# Patient Record
Sex: FEMALE | Race: WHITE | NOT HISPANIC OR LATINO | Employment: FULL TIME | URBAN - NONMETROPOLITAN AREA
[De-identification: names, ages, dates, MRNs, and addresses within clinical notes are randomized per-mention and may not be internally consistent; named-entity substitution may affect disease eponyms.]

---

## 2017-05-10 ENCOUNTER — OFFICE VISIT (OUTPATIENT)
Dept: RETAIL CLINIC | Facility: CLINIC | Age: 32
End: 2017-05-10

## 2017-05-10 DIAGNOSIS — Z23 FLU VACCINE NEED: Primary | ICD-10-CM

## 2022-12-16 ENCOUNTER — HOSPITAL ENCOUNTER (EMERGENCY)
Facility: HOSPITAL | Age: 37
Discharge: PSYCHIATRIC OR SUBSTANCE USE FACILITY | DRG: 897 | End: 2022-12-16
Attending: EMERGENCY MEDICINE | Admitting: EMERGENCY MEDICINE
Payer: MEDICAID

## 2022-12-16 ENCOUNTER — HOSPITAL ENCOUNTER (INPATIENT)
Facility: HOSPITAL | Age: 37
LOS: 4 days | Discharge: NURSING, REHAB OR LONG-TERM CARE | DRG: 897 | End: 2022-12-20
Attending: PSYCHIATRY & NEUROLOGY | Admitting: PSYCHIATRY & NEUROLOGY
Payer: MEDICAID

## 2022-12-16 VITALS
HEIGHT: 62 IN | BODY MASS INDEX: 22.08 KG/M2 | OXYGEN SATURATION: 99 % | WEIGHT: 120 LBS | DIASTOLIC BLOOD PRESSURE: 76 MMHG | RESPIRATION RATE: 16 BRPM | SYSTOLIC BLOOD PRESSURE: 142 MMHG | TEMPERATURE: 98.8 F | HEART RATE: 97 BPM

## 2022-12-16 DIAGNOSIS — F10.10 ALCOHOL ABUSE: Primary | ICD-10-CM

## 2022-12-16 PROBLEM — F19.20 CHEMICAL DEPENDENCY: Status: ACTIVE | Noted: 2022-12-16

## 2022-12-16 LAB
ALBUMIN SERPL-MCNC: 4.85 G/DL (ref 3.5–5.2)
ALBUMIN/GLOB SERPL: 1.5 G/DL
ALP SERPL-CCNC: 87 U/L (ref 39–117)
ALT SERPL W P-5'-P-CCNC: 33 U/L (ref 1–33)
AMPHET+METHAMPHET UR QL: NEGATIVE
AMPHETAMINES UR QL: NEGATIVE
ANION GAP SERPL CALCULATED.3IONS-SCNC: 18.1 MMOL/L (ref 5–15)
AST SERPL-CCNC: 29 U/L (ref 1–32)
BACTERIA UR QL AUTO: ABNORMAL /HPF
BARBITURATES UR QL SCN: NEGATIVE
BASOPHILS # BLD AUTO: 0.1 10*3/MM3 (ref 0–0.2)
BASOPHILS NFR BLD AUTO: 0.8 % (ref 0–1.5)
BENZODIAZ UR QL SCN: NEGATIVE
BILIRUB SERPL-MCNC: 0.5 MG/DL (ref 0–1.2)
BILIRUB UR QL STRIP: NEGATIVE
BUN SERPL-MCNC: 8 MG/DL (ref 6–20)
BUN/CREAT SERPL: 11.4 (ref 7–25)
BUPRENORPHINE SERPL-MCNC: NEGATIVE NG/ML
CALCIUM SPEC-SCNC: 10.1 MG/DL (ref 8.6–10.5)
CANNABINOIDS SERPL QL: NEGATIVE
CHLORIDE SERPL-SCNC: 94 MMOL/L (ref 98–107)
CLARITY UR: ABNORMAL
CO2 SERPL-SCNC: 23.9 MMOL/L (ref 22–29)
COCAINE UR QL: NEGATIVE
COLOR UR: YELLOW
CREAT SERPL-MCNC: 0.7 MG/DL (ref 0.57–1)
DEPRECATED RDW RBC AUTO: 48.3 FL (ref 37–54)
EGFRCR SERPLBLD CKD-EPI 2021: 114.4 ML/MIN/1.73
EOSINOPHIL # BLD AUTO: 0 10*3/MM3 (ref 0–0.4)
EOSINOPHIL NFR BLD AUTO: 0 % (ref 0.3–6.2)
ERYTHROCYTE [DISTWIDTH] IN BLOOD BY AUTOMATED COUNT: 17.8 % (ref 12.3–15.4)
ETHANOL BLD-MCNC: <10 MG/DL (ref 0–10)
ETHANOL UR QL: <0.01 %
FLUAV RNA RESP QL NAA+PROBE: NOT DETECTED
FLUBV RNA RESP QL NAA+PROBE: NOT DETECTED
GLOBULIN UR ELPH-MCNC: 3.2 GM/DL
GLUCOSE SERPL-MCNC: 128 MG/DL (ref 65–99)
GLUCOSE UR STRIP-MCNC: ABNORMAL MG/DL
HCG SERPL QL: NEGATIVE
HCT VFR BLD AUTO: 35.8 % (ref 34–46.6)
HGB BLD-MCNC: 11.1 G/DL (ref 12–15.9)
HGB UR QL STRIP.AUTO: NEGATIVE
HYALINE CASTS UR QL AUTO: ABNORMAL /LPF
IMM GRANULOCYTES # BLD AUTO: 0.09 10*3/MM3 (ref 0–0.05)
IMM GRANULOCYTES NFR BLD AUTO: 0.7 % (ref 0–0.5)
KETONES UR QL STRIP: ABNORMAL
LEUKOCYTE ESTERASE UR QL STRIP.AUTO: NEGATIVE
LYMPHOCYTES # BLD AUTO: 1.73 10*3/MM3 (ref 0.7–3.1)
LYMPHOCYTES NFR BLD AUTO: 13.9 % (ref 19.6–45.3)
MAGNESIUM SERPL-MCNC: 1.9 MG/DL (ref 1.6–2.6)
MCH RBC QN AUTO: 23.4 PG (ref 26.6–33)
MCHC RBC AUTO-ENTMCNC: 31 G/DL (ref 31.5–35.7)
MCV RBC AUTO: 75.5 FL (ref 79–97)
METHADONE UR QL SCN: NEGATIVE
MONOCYTES # BLD AUTO: 0.73 10*3/MM3 (ref 0.1–0.9)
MONOCYTES NFR BLD AUTO: 5.9 % (ref 5–12)
NEUTROPHILS NFR BLD AUTO: 78.7 % (ref 42.7–76)
NEUTROPHILS NFR BLD AUTO: 9.77 10*3/MM3 (ref 1.7–7)
NITRITE UR QL STRIP: POSITIVE
NRBC BLD AUTO-RTO: 0 /100 WBC (ref 0–0.2)
OPIATES UR QL: NEGATIVE
OXYCODONE UR QL SCN: NEGATIVE
PCP UR QL SCN: NEGATIVE
PH UR STRIP.AUTO: 7.5 [PH] (ref 5–8)
PLATELET # BLD AUTO: 439 10*3/MM3 (ref 140–450)
PMV BLD AUTO: 9 FL (ref 6–12)
POTASSIUM SERPL-SCNC: 3.9 MMOL/L (ref 3.5–5.2)
PROPOXYPH UR QL: NEGATIVE
PROT SERPL-MCNC: 8 G/DL (ref 6–8.5)
PROT UR QL STRIP: ABNORMAL
RBC # BLD AUTO: 4.74 10*6/MM3 (ref 3.77–5.28)
RBC # UR STRIP: ABNORMAL /HPF
REF LAB TEST METHOD: ABNORMAL
SARS-COV-2 RNA RESP QL NAA+PROBE: NOT DETECTED
SODIUM SERPL-SCNC: 136 MMOL/L (ref 136–145)
SP GR UR STRIP: 1.01 (ref 1–1.03)
SQUAMOUS #/AREA URNS HPF: ABNORMAL /HPF
TRICYCLICS UR QL SCN: NEGATIVE
UROBILINOGEN UR QL STRIP: ABNORMAL
WBC # UR STRIP: ABNORMAL /HPF
WBC NRBC COR # BLD: 12.42 10*3/MM3 (ref 3.4–10.8)

## 2022-12-16 PROCEDURE — 87636 SARSCOV2 & INF A&B AMP PRB: CPT | Performed by: EMERGENCY MEDICINE

## 2022-12-16 PROCEDURE — 83735 ASSAY OF MAGNESIUM: CPT | Performed by: EMERGENCY MEDICINE

## 2022-12-16 PROCEDURE — 93005 ELECTROCARDIOGRAM TRACING: CPT | Performed by: PSYCHIATRY & NEUROLOGY

## 2022-12-16 PROCEDURE — 99285 EMERGENCY DEPT VISIT HI MDM: CPT

## 2022-12-16 PROCEDURE — 93010 ELECTROCARDIOGRAM REPORT: CPT | Performed by: SPECIALIST

## 2022-12-16 PROCEDURE — 84703 CHORIONIC GONADOTROPIN ASSAY: CPT | Performed by: EMERGENCY MEDICINE

## 2022-12-16 PROCEDURE — 36415 COLL VENOUS BLD VENIPUNCTURE: CPT

## 2022-12-16 PROCEDURE — HZ2ZZZZ DETOXIFICATION SERVICES FOR SUBSTANCE ABUSE TREATMENT: ICD-10-PCS | Performed by: PSYCHIATRY & NEUROLOGY

## 2022-12-16 PROCEDURE — 80053 COMPREHEN METABOLIC PANEL: CPT | Performed by: EMERGENCY MEDICINE

## 2022-12-16 PROCEDURE — 85025 COMPLETE CBC W/AUTO DIFF WBC: CPT | Performed by: EMERGENCY MEDICINE

## 2022-12-16 PROCEDURE — 81001 URINALYSIS AUTO W/SCOPE: CPT | Performed by: EMERGENCY MEDICINE

## 2022-12-16 PROCEDURE — 87086 URINE CULTURE/COLONY COUNT: CPT | Performed by: PHYSICIAN ASSISTANT

## 2022-12-16 PROCEDURE — C9803 HOPD COVID-19 SPEC COLLECT: HCPCS

## 2022-12-16 PROCEDURE — 80306 DRUG TEST PRSMV INSTRMNT: CPT | Performed by: EMERGENCY MEDICINE

## 2022-12-16 PROCEDURE — 82077 ASSAY SPEC XCP UR&BREATH IA: CPT | Performed by: EMERGENCY MEDICINE

## 2022-12-16 RX ORDER — LORAZEPAM 2 MG/1
2 TABLET ORAL
Status: COMPLETED | OUTPATIENT
Start: 2022-12-17 | End: 2022-12-17

## 2022-12-16 RX ORDER — ECHINACEA PURPUREA EXTRACT 125 MG
2 TABLET ORAL AS NEEDED
Status: DISCONTINUED | OUTPATIENT
Start: 2022-12-16 | End: 2022-12-20 | Stop reason: HOSPADM

## 2022-12-16 RX ORDER — IBUPROFEN 400 MG/1
400 TABLET ORAL EVERY 6 HOURS PRN
Status: DISCONTINUED | OUTPATIENT
Start: 2022-12-16 | End: 2022-12-20 | Stop reason: HOSPADM

## 2022-12-16 RX ORDER — TRAZODONE HYDROCHLORIDE 50 MG/1
50 TABLET ORAL NIGHTLY PRN
Status: DISCONTINUED | OUTPATIENT
Start: 2022-12-16 | End: 2022-12-20 | Stop reason: HOSPADM

## 2022-12-16 RX ORDER — BENZTROPINE MESYLATE 1 MG/ML
1 INJECTION INTRAMUSCULAR; INTRAVENOUS ONCE AS NEEDED
Status: DISCONTINUED | OUTPATIENT
Start: 2022-12-16 | End: 2022-12-20 | Stop reason: HOSPADM

## 2022-12-16 RX ORDER — BENZTROPINE MESYLATE 1 MG/1
2 TABLET ORAL ONCE AS NEEDED
Status: DISCONTINUED | OUTPATIENT
Start: 2022-12-16 | End: 2022-12-20 | Stop reason: HOSPADM

## 2022-12-16 RX ORDER — LORAZEPAM 1 MG/1
1 TABLET ORAL EVERY 4 HOURS PRN
Status: ACTIVE | OUTPATIENT
Start: 2022-12-19 | End: 2022-12-20

## 2022-12-16 RX ORDER — LOPERAMIDE HYDROCHLORIDE 2 MG/1
2 CAPSULE ORAL
Status: DISCONTINUED | OUTPATIENT
Start: 2022-12-16 | End: 2022-12-20 | Stop reason: HOSPADM

## 2022-12-16 RX ORDER — LORAZEPAM 2 MG/1
2 TABLET ORAL EVERY 4 HOURS PRN
Status: ACTIVE | OUTPATIENT
Start: 2022-12-17 | End: 2022-12-18

## 2022-12-16 RX ORDER — ALUMINA, MAGNESIA, AND SIMETHICONE 2400; 2400; 240 MG/30ML; MG/30ML; MG/30ML
15 SUSPENSION ORAL EVERY 6 HOURS PRN
Status: DISCONTINUED | OUTPATIENT
Start: 2022-12-16 | End: 2022-12-20 | Stop reason: HOSPADM

## 2022-12-16 RX ORDER — CHOLECALCIFEROL (VITAMIN D3) 125 MCG
5 CAPSULE ORAL NIGHTLY PRN
Status: ON HOLD | COMMUNITY
End: 2022-12-20 | Stop reason: SDUPTHER

## 2022-12-16 RX ORDER — LORAZEPAM 0.5 MG/1
0.5 TABLET ORAL
Status: DISCONTINUED | OUTPATIENT
Start: 2022-12-20 | End: 2022-12-19

## 2022-12-16 RX ORDER — HYDROXYZINE HYDROCHLORIDE 25 MG/1
50 TABLET, FILM COATED ORAL EVERY 6 HOURS PRN
Status: DISCONTINUED | OUTPATIENT
Start: 2022-12-16 | End: 2022-12-20 | Stop reason: HOSPADM

## 2022-12-16 RX ORDER — BUSPIRONE HYDROCHLORIDE 10 MG/1
5 TABLET ORAL 3 TIMES DAILY PRN
Status: DISCONTINUED | OUTPATIENT
Start: 2022-12-16 | End: 2022-12-20 | Stop reason: HOSPADM

## 2022-12-16 RX ORDER — ONDANSETRON 4 MG/1
4 TABLET, FILM COATED ORAL EVERY 6 HOURS PRN
Status: DISCONTINUED | OUTPATIENT
Start: 2022-12-16 | End: 2022-12-20 | Stop reason: HOSPADM

## 2022-12-16 RX ORDER — CHOLECALCIFEROL (VITAMIN D3) 125 MCG
5 CAPSULE ORAL NIGHTLY PRN
Status: DISCONTINUED | OUTPATIENT
Start: 2022-12-16 | End: 2022-12-20 | Stop reason: HOSPADM

## 2022-12-16 RX ORDER — BUSPIRONE HYDROCHLORIDE 5 MG/1
5 TABLET ORAL 3 TIMES DAILY PRN
Status: ON HOLD | COMMUNITY
End: 2022-12-20 | Stop reason: SDUPTHER

## 2022-12-16 RX ORDER — LORAZEPAM 0.5 MG/1
0.5 TABLET ORAL EVERY 4 HOURS PRN
Status: DISCONTINUED | OUTPATIENT
Start: 2022-12-20 | End: 2022-12-20 | Stop reason: HOSPADM

## 2022-12-16 RX ORDER — LORAZEPAM 2 MG/1
2 TABLET ORAL
Status: DISPENSED | OUTPATIENT
Start: 2022-12-16 | End: 2022-12-17

## 2022-12-16 RX ORDER — LORAZEPAM 1 MG/1
1 TABLET ORAL
Status: DISCONTINUED | OUTPATIENT
Start: 2022-12-19 | End: 2022-12-19

## 2022-12-16 RX ORDER — CEFDINIR 300 MG/1
300 CAPSULE ORAL EVERY 12 HOURS SCHEDULED
Status: DISCONTINUED | OUTPATIENT
Start: 2022-12-16 | End: 2022-12-20 | Stop reason: HOSPADM

## 2022-12-16 RX ORDER — FAMOTIDINE 20 MG/1
20 TABLET, FILM COATED ORAL 2 TIMES DAILY PRN
Status: DISCONTINUED | OUTPATIENT
Start: 2022-12-16 | End: 2022-12-20 | Stop reason: HOSPADM

## 2022-12-16 RX ORDER — LORAZEPAM 2 MG/1
2 TABLET ORAL ONCE
Status: COMPLETED | OUTPATIENT
Start: 2022-12-16 | End: 2022-12-16

## 2022-12-16 RX ORDER — BENZONATATE 100 MG/1
100 CAPSULE ORAL 3 TIMES DAILY PRN
Status: DISCONTINUED | OUTPATIENT
Start: 2022-12-16 | End: 2022-12-20 | Stop reason: HOSPADM

## 2022-12-16 RX ORDER — ACETAMINOPHEN 325 MG/1
650 TABLET ORAL EVERY 6 HOURS PRN
Status: DISCONTINUED | OUTPATIENT
Start: 2022-12-16 | End: 2022-12-20 | Stop reason: HOSPADM

## 2022-12-16 RX ADMIN — LORAZEPAM 2 MG: 2 TABLET ORAL at 14:35

## 2022-12-16 RX ADMIN — CEFDINIR 300 MG: 300 CAPSULE ORAL at 14:35

## 2022-12-16 RX ADMIN — CEFDINIR 300 MG: 300 CAPSULE ORAL at 21:31

## 2022-12-16 RX ADMIN — LORAZEPAM 2 MG: 2 TABLET ORAL at 12:45

## 2022-12-16 RX ADMIN — LORAZEPAM 2 MG: 2 TABLET ORAL at 19:06

## 2022-12-16 NOTE — NURSING NOTE
Patient arrived at intake. Searched with 2 staff members present. Patient's belongings placed in safe storage. Patient advised to wear a mask, and to remain 6 feet from others.

## 2022-12-16 NOTE — NURSING NOTE
Seeking help to detox off of alcohol. Drinking 1/5th of rum or whiskey daily for 1 year except when in treatment at the [facility] in October for 28 days, then sober for 2-3 week after discharge. She is going through a divorce, she and   in November. Patient and her 4 children-ages 11, 9, 7, 5 years are living with mother-in-law. Patient's mother is ill, she has end stage breast cancer. Patient had a fall about 7 weeks ago and fractured her left tibia- she is wearing a brace during ambulation to immobilize. She got a DUI on Monday, 12/12/22, and has court January 29*,2022. She reports hx of auditory hallucinations this summer after not drinking or sleeping for three days. Anxiety and depression rated 5/10, craving rated 0/10. CIWA score: 8 She reports fair appetite, little to eat today but making self eat. She reports drinking until passing out, unable to stay asleep, not feeling rested, sleeping 4 hours- off/on last night.

## 2022-12-16 NOTE — NURSING NOTE
PT is a 37 y o female who presents to Intake for Detox off of alcohol. PT states her last drink was Bacardi yesterday. Could not state what time. States she usually drinks a fifth per day, but does not believe she did this yesterday. She has been drinking this amount off and on for a year.  Longest period of sobriety was 6 weeks when she was at the [facility] October 2022.     COWS 9, CIWA 9    Rates depression 9/10, and anxiety 10/10.    Denies SI, denies HI.    PT states she has mitrovalve prolapse, but is asymptomatic.     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     PT states she is going to the [facility] after she id discharged here.

## 2022-12-16 NOTE — NURSING NOTE
PT is preparing to be admitting for OBS on detox. Called floor. Stacie will call back to receive report.

## 2022-12-16 NOTE — ED PROVIDER NOTES
Subjective   History of Present Illness  37-year-old female presents secondary to need for alcohol detox.  Patient states that she has been drinking alcohol for years however for the last has become all day long.  She states that she typically drinks about 1/5 a day.  She denies any suicidal homicidal ideation.  She last drank last evening however she is not sure what time she stopped.  She denies any new medical complaints.  No exposure to flu or COVID.  She voices no other complaints        Review of Systems   Constitutional: Negative.  Negative for fever.   HENT: Negative.    Respiratory: Negative.    Cardiovascular: Negative.  Negative for chest pain.   Gastrointestinal: Negative.  Negative for abdominal pain.   Endocrine: Negative.    Genitourinary: Negative.  Negative for dysuria.   Skin: Negative.    Neurological: Negative.    Psychiatric/Behavioral: Negative.    All other systems reviewed and are negative.      Past Medical History:   Diagnosis Date   • MVP (mitral valve prolapse)        Allergies   Allergen Reactions   • Morphine Hives and GI Intolerance       Past Surgical History:   Procedure Laterality Date   •  SECTION      I have had 5 c sections in the past       No family history on file.    Social History     Socioeconomic History   • Marital status:    Tobacco Use   • Smoking status: Never   • Smokeless tobacco: Never   Vaping Use   • Vaping Use: Never used   Substance and Sexual Activity   • Alcohol use: Not Currently     Comment: I drink a fifth of alcohol per day.   • Drug use: Yes     Types: Marijuana     Comment: I smoke occasional. I smoked a joint a month ago.   • Sexual activity: Yes     Partners: Male           Objective   Physical Exam  Vitals and nursing note reviewed.   Constitutional:       General: She is not in acute distress.     Appearance: She is well-developed. She is not diaphoretic.   HENT:      Head: Normocephalic and atraumatic.      Right Ear: External ear  normal.      Left Ear: External ear normal.      Nose: Nose normal.   Eyes:      Conjunctiva/sclera: Conjunctivae normal.      Pupils: Pupils are equal, round, and reactive to light.   Neck:      Vascular: No JVD.      Trachea: No tracheal deviation.   Cardiovascular:      Rate and Rhythm: Normal rate and regular rhythm.      Heart sounds: Normal heart sounds. No murmur heard.  Pulmonary:      Effort: Pulmonary effort is normal. No respiratory distress.      Breath sounds: Normal breath sounds. No wheezing.   Abdominal:      General: Bowel sounds are normal.      Palpations: Abdomen is soft.      Tenderness: There is no abdominal tenderness.   Musculoskeletal:         General: No deformity. Normal range of motion.      Cervical back: Normal range of motion and neck supple.   Skin:     General: Skin is warm and dry.      Coloration: Skin is not pale.      Findings: No erythema or rash.   Neurological:      Mental Status: She is alert and oriented to person, place, and time.      Cranial Nerves: No cranial nerve deficit.   Psychiatric:         Behavior: Behavior normal.         Thought Content: Thought content normal. Thought content does not include homicidal or suicidal ideation.         Procedures           ED Course                                           MDM  Number of Diagnoses or Management Options     Amount and/or Complexity of Data Reviewed  Clinical lab tests: ordered and reviewed        Final diagnoses:   Alcohol abuse       ED Disposition  ED Disposition     ED Disposition   DC/Transfer to Behavioral Health    Condition   Stable    Comment   --             No follow-up provider specified.       Medication List      No changes were made to your prescriptions during this visit.          Delvin Mart PA  12/16/22 1179

## 2022-12-16 NOTE — PLAN OF CARE
Problem: Adult Behavioral Health Plan of Care  Goal: Plan of Care Review  12/16/2022 1740 by Stacie Mooney, RN  Outcome: Ongoing, Progressing  Flowsheets (Taken 12/16/2022 1740)  Plan of Care Reviewed With: patient  Patient Agreement with Plan of Care: agrees  12/16/2022 1739 by Stacie Mooney, RN  Outcome: Ongoing, Progressing  Flowsheets (Taken 12/16/2022 1739)  Plan of Care Reviewed With: patient  Patient Agreement with Plan of Care: agrees   Goal Outcome Evaluation:  Plan of Care Reviewed With: patient  Patient Agreement with Plan of Care: agrees

## 2022-12-16 NOTE — NURSING NOTE
Dr. elizalde notified of clients pulse of 123, ciwa score is 11. Order given for admission to psychiatry and imitate ativan detox admission day.

## 2022-12-16 NOTE — PLAN OF CARE
Problem: Adult Behavioral Health Plan of Care  Goal: Plan of Care Review  Outcome: Ongoing, Progressing  Flowsheets (Taken 12/16/2022 2410)  Plan of Care Reviewed With: patient  Patient Agreement with Plan of Care: agrees   Goal Outcome Evaluation:  Plan of Care Reviewed With: patient  Patient Agreement with Plan of Care: agrees

## 2022-12-16 NOTE — NURSING NOTE
Spoke with Dr. Marie. Reviewed labs, and assessment. New orders noted to admit to obs on detox routine orders. [id number].

## 2022-12-17 LAB
BACTERIA SPEC AEROBE CULT: NORMAL
QT INTERVAL: 336 MS
QTC INTERVAL: 464 MS

## 2022-12-17 PROCEDURE — 99223 1ST HOSP IP/OBS HIGH 75: CPT | Performed by: PSYCHIATRY & NEUROLOGY

## 2022-12-17 RX ADMIN — Medication 100 MG: at 16:22

## 2022-12-17 RX ADMIN — BUSPIRONE HYDROCHLORIDE 5 MG: 10 TABLET ORAL at 08:31

## 2022-12-17 RX ADMIN — Medication 5 MG: at 21:26

## 2022-12-17 RX ADMIN — LORAZEPAM 2 MG: 2 TABLET ORAL at 21:26

## 2022-12-17 RX ADMIN — CEFDINIR 300 MG: 300 CAPSULE ORAL at 08:31

## 2022-12-17 RX ADMIN — LORAZEPAM 2 MG: 2 TABLET ORAL at 14:42

## 2022-12-17 RX ADMIN — IBUPROFEN 400 MG: 400 TABLET, FILM COATED ORAL at 08:31

## 2022-12-17 RX ADMIN — CEFDINIR 300 MG: 300 CAPSULE ORAL at 21:26

## 2022-12-17 RX ADMIN — LORAZEPAM 2 MG: 2 TABLET ORAL at 08:31

## 2022-12-17 NOTE — H&P
INITIAL PSYCHIATRIC HISTORY & PHYSICAL    Patient Identification:  Name:   Nanette Pabon  Age:   37 y.o.  Sex:   female  :   1985  MRN:   [id number]  Visit Number:   [id number]  Primary Care Physician:   Daniel Bauer MD    SUBJECTIVE    CC/Focus of Exam: detox    HPI: Nanette Pabon is a 37 y.o. female who was admitted on 2022 with complaints of alcohol use and withdrawals. The patient reports a long history of substance use. First use was 33 years old. Over time the use increased and the patient  continued to use despite negative consequences. The patient endorses symptoms of tolerance and withdrawals. Has tried to cut down and stop but has not been successful. Spends too much time and resources in pursuit of substance use. Longest period of sobriety is reported to be 6 weeks.  Currently using fifth of bacardi rum  Last use 12-  Withdrawal symptoms Patient denies any  Patient denies any substance abuse. Patient denies any tobacco use.  Patient denies any history of seizures with withdrawal. Patient states family as the reason she relapsed. Patient states going through a divorce and her mother's health as stressors in her life. Patient denies any history of physical, mental or sexual abuse. Patient rates her appetite as fair. Patient rates her sleep as poor. Patient denies any nightmares. Patient rates her anxiety on a scale of 1/10 with 10 being the most severe a 5. Patient rates her depression on a scale of 1/10 with 10 being the most severe a 5. Patient denies any cravings. Patient's CIWA was 11. Patient's COWS was 9. Patient denies any suicidal ideation. Patient denies any homicidal ideation. Patient denies any hallucinations.  Patient was admitted to [facility] psychiatry for further safety and stabilization.    Available medical/psychiatric records reviewed and incorporated into the current document.     PAST PSYCHIATRIC HX: Patient has had no prior admissions. Patient  denies any outpatient care.     SUBSTANCE USE HX: UDS was negative. See HPI for current use.     SOCIAL HX: Patient states that she was born and raised in [place], [place]. Patient states that she currently resides with her in laws in [place], [place]. Patient states that she is  but states that they are currently . Patient states that she has 4 children that lives with their father. Patient states that she is currently unemployed. Patient states that she has a associate's degree. Patient states that she has legal issues. Patient states that she goes to court 2023 for a DUI.     Past Medical History:   Diagnosis Date   • Fracture left tibia     about 7 weeks ago// end of 2022   • MVP (mitral valve prolapse)        Past Surgical History:   Procedure Laterality Date   •  SECTION      I have had 5 c sections in the past       Family History   Problem Relation Age of Onset   • Breast cancer Mother    • Alcohol abuse Father          Medications Prior to Admission   Medication Sig Dispense Refill Last Dose   • busPIRone (BUSPAR) 5 MG tablet Take 5 mg by mouth 3 (Three) Times a Day As Needed.   12/15/2022   • melatonin 5 MG tablet tablet Take 5 mg by mouth At Night As Needed.   Past Week           ALLERGIES:  Morphine    Temp:  [97.1 °F (36.2 °C)-99.3 °F (37.4 °C)] 98.1 °F (36.7 °C)  Heart Rate:  [] 110  Resp:  [16-18] 16  BP: (106-155)/(63-92) 106/63    REVIEW OF SYSTEMS:  Review of Systems   Constitutional: Positive for activity change and fatigue.   HENT: Negative for congestion and voice change.    Eyes: Negative for photophobia and visual disturbance.   Respiratory: Negative for shortness of breath and wheezing.    Cardiovascular: Negative for chest pain and palpitations.   Gastrointestinal: Negative for nausea and vomiting.   Endocrine: Negative for cold intolerance and heat intolerance.   Genitourinary: Negative for frequency and urgency.   Musculoskeletal: Negative for neck  pain and neck stiffness.   Skin: Negative for rash and wound.   Allergic/Immunologic: Negative for environmental allergies and food allergies.   Neurological: Positive for tremors and weakness.   Hematological: Negative for adenopathy. Does not bruise/bleed easily.   Psychiatric/Behavioral: Positive for dysphoric mood. The patient is nervous/anxious.       See HPI for psychiatric ROS  OBJECTIVE    PHYSICAL EXAM:  Physical Exam  Constitutional:       Appearance: Normal appearance. She is normal weight.   HENT:      Head: Normocephalic and atraumatic.      Right Ear: External ear normal.      Left Ear: External ear normal.      Nose: Nose normal.      Mouth/Throat:      Mouth: Mucous membranes are moist.      Pharynx: Oropharynx is clear.   Eyes:      Extraocular Movements: Extraocular movements intact.      Conjunctiva/sclera: Conjunctivae normal.      Pupils: Pupils are equal, round, and reactive to light.   Cardiovascular:      Rate and Rhythm: Normal rate and regular rhythm.      Pulses: Normal pulses.      Heart sounds: Normal heart sounds.   Pulmonary:      Effort: Pulmonary effort is normal.      Breath sounds: Normal breath sounds.   Abdominal:      General: Abdomen is flat. Bowel sounds are normal.      Palpations: Abdomen is soft.   Musculoskeletal:         General: Normal range of motion.      Cervical back: Normal range of motion and neck supple.   Skin:     General: Skin is warm and dry.   Neurological:      General: No focal deficit present.      Mental Status: She is alert and oriented to person, place, and time. Mental status is at baseline.         MENTAL STATUS EXAM:               Hygiene:   fair  Cooperation:  Cooperative  Eye Contact:  Good  Psychomotor Behavior:  Appropriate  Affect:  Appropriate  Hopelessness: 5  Speech:  Normal  Linear  Thought Content:  Normal  Suicidal:  None  Homicidal:  None  Hallucinations:  None  Delusion:  None  Memory:  Intact  Orientation:  Person, Place, Time and  Situation  Reliability:  fair  Insight:  Fair  Judgement:  Poor  Impulse Control:  Poor      Imaging Results (Last 24 Hours)     ** No results found for the last 24 hours. **           ECG/EMG Results (most recent)     Procedure Component Value Units Date/Time    ECG 12 Lead Other [[id number]] Collected: 12/16/22 1554     Updated: 12/16/22 1623     QT Interval 336 ms      QTC Interval 464 ms     Narrative:      Test Reason : Other~  Blood Pressure :   */*   mmHG  Vent. Rate : 115 BPM     Atrial Rate : 115 BPM     P-R Int : 124 ms          QRS Dur :  80 ms      QT Int : 336 ms       P-R-T Axes :  49  53  45 degrees     QTc Int : 464 ms    Sinus tachycardia  Otherwise normal ECG  When compared with ECG of 16-DEC-2022 15:54, (Unconfirmed)  No significant change was found    Referred By: ANDREWS           Confirmed By:            Lab Results   Component Value Date    GLUCOSE 128 (H) 12/16/2022    BUN 8 12/16/2022    CREATININE 0.70 12/16/2022    BCR 11.4 12/16/2022    CO2 23.9 12/16/2022    CALCIUM 10.1 12/16/2022    ALBUMIN 4.85 12/16/2022    AST 29 12/16/2022    ALT 33 12/16/2022       Lab Results   Component Value Date    WBC 12.42 (H) 12/16/2022    HGB 11.1 (L) 12/16/2022    HCT 35.8 12/16/2022    MCV 75.5 (L) 12/16/2022     12/16/2022       Pain Management Panel     Pain Management Panel Latest Ref Rng & Units 12/16/2022    AMPHETAMINES SCREEN, URINE Negative Negative    BARBITURATES SCREEN Negative Negative    BENZODIAZEPINE SCREEN, URINE Negative Negative    BUPRENORPHINEUR Negative Negative    COCAINE SCREEN, URINE Negative Negative    METHADONE SCREEN, URINE Negative Negative    METHAMPHETAMINEUR Negative Negative          Brief Urine Lab Results  (Last result in the past 365 days)      Color   Clarity   Blood   Leuk Est   Nitrite   Protein   CREAT   Urine HCG        12/16/22 1143 Yellow   Cloudy   Negative   Negative   Positive   Trace                 Reviewed labs and studies done with this admission.        ASSESSMENT & PLAN:      Alcohol use disorder, severe, dependence, in withdrawal  -Admitted for crisis stabilization and voluntary detox  -Ativan detox protocol with CIWA  -Supplement thiamine and multivitamin  -Encourage cessation and substance abuse treatment at discharge    UTI in  -Asymptomatic but UA indicative of UTI  -Continue cefdinir 300 mg twice daily for 5 days    The patient has been admitted for safety and stabilization.  Patient will be monitored for suicidality daily and maintained on Special Precautions Level 4 (q30 min checks)Special  Precautions Level 4 (q30 min checks).  The patient will have individual and group therapy with a master's level therapist. A master treatment plan will be developed and agreed upon by the patient and his/her treatment team.  The patient's estimated length of stay in the hospital is 5-7 days.           Written by Nan Avila acting as scribe for Dr.Jonathan Rhodes signature on this note affirms that the note adequately documents the care provided.   This note was generated using a scribe,   Nan Avila MA  12/17/22  10:55 AM EST

## 2022-12-17 NOTE — NURSING NOTE
Client attempted several times with different staff to call a . States \"I need to let them know right away that I want to do an outpatient program. They are there til six ocklock today.\" client was allowed to use phone at desk and no answer at the number she called. Immediately after her  called and asked for her stating that she just called him. Client lied to staff about who she was calling.

## 2022-12-17 NOTE — PLAN OF CARE
Goal Outcome Evaluation:  Plan of Care Reviewed With: patient  Patient Agreement with Plan of Care: agrees     Progress: improving     Pt slept well, appetite is poor for shift, and refused group. Pt given Ativan 2mg PO prn for tachycardia.

## 2022-12-17 NOTE — DISCHARGE INSTR - APPOINTMENTS
[facility]  [email or web address]  [address] [address] [phone]     Return at discharge. 12/20/2022

## 2022-12-17 NOTE — PLAN OF CARE
Problem: Adult Behavioral Health Plan of Care  Goal: Plan of Care Review  12/17/2022 1642 by Stacie Mooney, RN  Outcome: Ongoing, Progressing  Flowsheets (Taken 12/17/2022 1642)  Plan of Care Reviewed With: patient  Patient Agreement with Plan of Care: agrees  12/17/2022 1641 by Stacie Mooney, RN  Outcome: Ongoing, Progressing  Flowsheets  Taken 12/17/2022 1641  Plan of Care Reviewed With: patient  Patient Agreement with Plan of Care: agrees  Taken 12/17/2022 0806  Plan of Care Reviewed With: patient  Patient Agreement with Plan of Care: agrees   Goal Outcome Evaluation:  Plan of Care Reviewed With: patient  Patient Agreement with Plan of Care: agrees

## 2022-12-17 NOTE — PLAN OF CARE
Problem: Adult Behavioral Health Plan of Care  Goal: Plan of Care Review  Outcome: Ongoing, Progressing  Flowsheets  Taken 12/17/2022 1641  Plan of Care Reviewed With: patient  Patient Agreement with Plan of Care: agrees  Taken 12/17/2022 0806  Plan of Care Reviewed With: patient  Patient Agreement with Plan of Care: agrees   Goal Outcome Evaluation:  Plan of Care Reviewed With: patient  Patient Agreement with Plan of Care: agrees

## 2022-12-17 NOTE — PLAN OF CARE
Problem: Adult Behavioral Health Plan of Care  Goal: Plan of Care Review  Outcome: Ongoing, Progressing  Flowsheets (Taken 12/17/2022 1352)  Consent Given to Review Plan with: no consent today  Progress: improving  Plan of Care Reviewed With: patient  Patient Agreement with Plan of Care: agrees  Outcome Evaluation: reviewed plan of care and completed adult social history     Problem: Adult Behavioral Health Plan of Care  Goal: Patient-Specific Goal (Individualization)  Outcome: Ongoing, Progressing  Flowsheets  Taken 12/17/2022 1352 by Shira Pickens LCSW  Patient-Specific Goals (Include Timeframe): Nanette to complete medical detox prior to discharge, identify 1-2 healthy coping skills to assist with relapse prevention during her 3 to 7 day hospital stay, engage in safe disposition planning prior to discharge.  Taken 12/17/2022 1348 by Shira Pickens LCSW  Patient Personal Strengths:   motivated for treatment   resourceful   expressive of needs   expressive of emotions   interests/hobbies   spiritual/[place] support   positive educational history   positive vocational history   motivated for recovery  Patient Vulnerabilities: substance abuse/addiction  Taken 12/16/2022 1520 by Lenore Cintron, RN  Individualized Care Needs: plans on going to [facility] after detox  Anxieties, Fears or Concerns: none reported     Problem: Adult Behavioral Health Plan of Care  Goal: Optimized Coping Skills in Response to Life Stressors  Outcome: Ongoing, Progressing  Flowsheets (Taken 12/17/2022 1352)  Optimized Coping Skills in Response to Life Stressors: making progress toward outcome     Problem: Adult Behavioral Health Plan of Care  Goal: Optimized Coping Skills in Response to Life Stressors  Intervention: Promote Effective Coping Strategies  Flowsheets (Taken 12/17/2022 1352)  Supportive Measures:   active listening utilized   positive reinforcement provided   self-responsibility promoted   counseling  provided   problem-solving facilitated   verbalization of feelings encouraged   decision-making supported   goal-setting facilitated   self-care encouraged   self-reflection promoted     Problem: Adult Behavioral Health Plan of Care  Goal: Develops/Participates in Therapeutic [place] to Support Successful Transition  Outcome: Ongoing, Progressing  Flowsheets (Taken 12/17/2022 1352)  Develops/Participates in Therapeutic [place] to Support Successful Transition: making progress toward outcome     Problem: Adult Behavioral Health Plan of Care  Goal: Develops/Participates in Therapeutic [place] to Support Successful Transition  Intervention: Foster Therapeutic [place]  Flowsheets (Taken 12/17/2022 1352)  Trust Relationship/Rapport:   care explained   reassurance provided   choices provided   thoughts/feelings acknowledged   emotional support provided   empathic listening provided   questions answered   questions encouraged     Problem: Adult Behavioral Health Plan of Care  Goal: Develops/Participates in Therapeutic [place] to Support Successful Transition  Intervention: Mutually Develop Transition Plan  Flowsheets  Taken 12/17/2022 1352  Transition Support:   community resources reviewed   crisis management plan promoted   follow-up care discussed  Taken 12/17/2022 1329  Discharge Coordination/Progress: Patient has [place] Medicaid insurance, agency for transport-[facility]-consent obtained.  Transportation Anticipated: agency  Current Discharge Risk: substance use/abuse  Concerns to be Addressed:   coping/stress   substance/tobacco abuse/use  Readmission Within the Last 30 Days: no previous admission in last 30 days  Patient/Family Anticipated Services at Transition: rehabilitation services  Patient's Choice of Community Agency(s): [facility]  Patient/Family Anticipates Transition to: inpatient rehabilitation facility  Offered/Gave Vendor List: no   Goal Outcome Evaluation:  Plan  of Care Reviewed With: patient  Patient Agreement with Plan of Care: agrees  Consent Given to Review Plan with: no consent today  Progress: improving  Outcome Evaluation: reviewed plan of care and completed adult social history    DATA:         Therapist met individually with patient this date to introduce role and to discuss hospitalization expectations. Patient agreeable.      Clinical Maneuvering/Intervention:     Therapist assisted patient in processing above session content; acknowledged and normalized patient’s thoughts, feelings, and concerns.  Discussed the therapist/patient relationship and explain the parameters and limitations of relative confidentiality.  Also discussed the importance of active participation, and honesty to the treatment process.  Encouraged the patient to discuss/vent their feelings, frustrations, and fears concerning their ongoing medical issues and validated their feelings.     Discussed the importance of finding enjoyable activities and coping skills that the patient can engage in a regular basis. Discussed healthy coping skills such as distraction, self love, grounding, thought challenges/reframing, etc.  Provided patient with list of healthy coping skills this date. Discussed the importance of medication compliance.  Praised the patient for seeking help and spent the majority of the session building rapport.       Allowed patient to freely discuss issues without interruption or judgment. Provided safe, confidential environment to facilitate the development of positive therapeutic relationship and encourage open, honest communication.      Therapist addressed discharge safety planning this date. Assisted patient in identifying risk factors which would indicate the need for higher level of care after discharge;  including thoughts to harm self or others and/or self-harming behavior. Encouraged patient to call 911, or present to the nearest emergency room should any of these events  occur. Discussed crisis intervention services and means to access.  Encouraged securing any objects of harm.       Therapist completed integrated summary, treatment plan, and initiated social history this date.  Therapist is strongly encouraging family involvement in treatment.       ASSESSMENT:      The patient is a 37 year old , , unemployed female who has been residing recently with her mother-in-law in [facility].  Patient presents requesting medical detox from daily alcohol dependence and withdrawal  Patient reports drinking 1/5 of rum or whiskey daily.  She reports completed [facility] day program and stayed sober for 2-3 weeks following.  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  She reports that things just keep happening and she has been coping with alcohol.  She reported today that her  asked to move in his new girlfriend into the home which she feels is very disrespectful. She reports she lost a child to Caceres's Syndrome in 2016 and that her mother currently has stage 4 cancer.  She signed consent today to attend the [facility] in [place] and was hopeful to go today or tomorrow, however was informed that she would need to complete the program here first.     PLAN:       Patient to remain hospitalized this date.     Treatment team will focus efforts on stabilizing patient's acute symptoms while providing education on healthy coping and crisis management to reduce hospitalizations.   Patient requires daily psychiatrist evaluation and 24/7 nursing supervision to promote patient  safety.     Therapist will offer 1-4 individual sessions, 1 therapy group daily, family education, and appropriate referral.    Patient consents to attend the [facility] in [place] upon stabilization.

## 2022-12-17 NOTE — PLAN OF CARE
Problem: Adult Behavioral Health Plan of Care  Goal: Plan of Care Review  12/17/2022 1643 by Stacie Mooney, RN  Outcome: Ongoing, Progressing  Flowsheets (Taken 12/17/2022 1643)  Plan of Care Reviewed With: patient  Patient Agreement with Plan of Care: agrees  12/17/2022 1642 by Stacie Mooney, RN  Outcome: Ongoing, Progressing  Flowsheets (Taken 12/17/2022 1642)  Plan of Care Reviewed With: patient  Patient Agreement with Plan of Care: agrees  12/17/2022 1641 by Stacie Mooney, FELIZ  Outcome: Ongoing, Progressing  Flowsheets  Taken 12/17/2022 1641  Plan of Care Reviewed With: patient  Patient Agreement with Plan of Care: agrees  Taken 12/17/2022 0806  Plan of Care Reviewed With: patient  Patient Agreement with Plan of Care: agrees   Goal Outcome Evaluation:  Plan of Care Reviewed With: patient  Patient Agreement with Plan of Care: agrees

## 2022-12-18 PROCEDURE — 99231 SBSQ HOSP IP/OBS SF/LOW 25: CPT | Performed by: PSYCHIATRY & NEUROLOGY

## 2022-12-18 RX ADMIN — Medication 100 MG: at 09:08

## 2022-12-18 RX ADMIN — CEFDINIR 300 MG: 300 CAPSULE ORAL at 09:08

## 2022-12-18 RX ADMIN — LORAZEPAM 1.5 MG: 1 TABLET ORAL at 09:08

## 2022-12-18 RX ADMIN — Medication 5 MG: at 21:18

## 2022-12-18 RX ADMIN — LORAZEPAM 1.5 MG: 1 TABLET ORAL at 15:20

## 2022-12-18 RX ADMIN — CEFDINIR 300 MG: 300 CAPSULE ORAL at 21:18

## 2022-12-18 RX ADMIN — LORAZEPAM 1.5 MG: 1 TABLET ORAL at 21:18

## 2022-12-18 NOTE — PROGRESS NOTES
INPATIENT PSYCHIATRIC PROGRESS NOTE    Name:  Nanette Pabon  :  1985  MRN:  [id number]  Visit Number:  [id number]  Length of stay:  2    SUBJECTIVE    CC/Focus of Exam: Alcohol use disorder    INTERVAL HISTORY: Patient reports no major withdrawal symptoms today.  She reports that her anxiety and depression are somewhat elevated due to being in the hospital and she has requested discharge early but I advised that she is on a high dose of Ativan to help with her withdrawal symptoms and if she were to leave, it would be AGAINST MEDICAL ADVICE.  Patient also requested to use a phone to call her  but ended up calling her  which was found out by nursing staff after her  called back and reported a missed call.  I feel that patient likely will push for an AMA discharge.  She does not seem engaged in treatment    Depression rating 6/10  Anxiety rating 8/10  Sleep: sleeping well  Withdrawal sx: 0  Cravin/10    Review of Systems   Constitutional: Negative.    HENT: Negative.    Respiratory: Negative.    Cardiovascular: Negative.    Gastrointestinal: Negative.    Musculoskeletal: Negative.        OBJECTIVE    Temp:  [96.8 °F (36 °C)-97.8 °F (36.6 °C)] 96.8 °F (36 °C)  Heart Rate:  [] 110  Resp:  [16-18] 18  BP: (113-149)/(67-91) 139/91    MENTAL STATUS EXAM:  Appearance: Casually dressed, good hygeine.   Cooperation: Cooperative  Psychomotor: No psychomotor agitation/retardation, No EPS, No motor tics  Speech: normal rate, amount.  Mood: \"Just sick of being here\"   Affect: congruent, appropriate  Thought Content: goal directed, no delusional material present  Thought process: linear, organized.  Suicidality: No SI  Homicidality: No HI  Perception: No AH/VH  Insight: fair   Judgment: fair    Lab Results (last 24 hours)     ** No results found for the last 24 hours. **             Imaging Results (Last 24 Hours)     ** No results found for the last 24 hours. **             ECG/EMG  Results (most recent)     Procedure Component Value Units Date/Time    ECG 12 Lead Other [[id number]] Collected: 12/16/22 1554     Updated: 12/17/22 1305     QT Interval 336 ms      QTC Interval 464 ms     Narrative:      Test Reason : Other~  Blood Pressure :   */*   mmHG  Vent. Rate : 115 BPM     Atrial Rate : 115 BPM     P-R Int : 124 ms          QRS Dur :  80 ms      QT Int : 336 ms       P-R-T Axes :  49  53  45 degrees     QTc Int : 464 ms    Sinus tachycardia  Otherwise normal ECG  When compared with ECG of 16-DEC-2022 15:54, (Unconfirmed)  No significant change was found  Confirmed by Lois Henley ([id number]) on 12/17/2022 1:04:51 PM    Referred By: ANDREWS           Confirmed By: Lois Henley           ALLERGIES: Morphine      Current Facility-Administered Medications:   •  acetaminophen (TYLENOL) tablet 650 mg, 650 mg, Oral, Q6H PRN, Ranjan Marie MD  •  aluminum-magnesium hydroxide-simethicone (MAALOX MAX) 400-400-40 MG/5ML suspension 15 mL, 15 mL, Oral, Q6H PRN, Ranjan Marie MD  •  benzonatate (TESSALON) capsule 100 mg, 100 mg, Oral, TID PRN, Ranjan Marie MD  •  benztropine (COGENTIN) tablet 2 mg, 2 mg, Oral, Once PRN **OR** benztropine (COGENTIN) injection 1 mg, 1 mg, Intramuscular, Once PRN, Ranjan Marie MD  •  busPIRone (BUSPAR) tablet 5 mg, 5 mg, Oral, TID PRN, Ranjan Marie MD, 5 mg at 12/17/22 0831  •  cefdinir (OMNICEF) capsule 300 mg, 300 mg, Oral, Q12H, Armando Varghese MD, 300 mg at 12/18/22 0908  •  famotidine (PEPCID) tablet 20 mg, 20 mg, Oral, BID PRN, Ranjan Marie MD  •  hydrOXYzine (ATARAX) tablet 50 mg, 50 mg, Oral, Q6H PRN, Ranjan Marie MD  •  ibuprofen (ADVIL,MOTRIN) tablet 400 mg, 400 mg, Oral, Q6H PRN, Ranjan Marie MD, 400 mg at 12/17/22 0831  •  loperamide (IMODIUM) capsule 2 mg, 2 mg, Oral, Q2H PRN, Ranjan Marie MD  •  [COMPLETED] LORazepam (ATIVAN) tablet 2 mg, 2 mg, Oral, 3 times per day, 2 mg at 12/17/22 2126 **FOLLOWED BY** LORazepam (ATIVAN) tablet 1.5 mg, 1.5 mg, Oral,  3 times per day, 1.5 mg at 22 0908 **FOLLOWED BY** [START ON 2022] LORazepam (ATIVAN) tablet 1 mg, 1 mg, Oral, 3 times per day **FOLLOWED BY** [START ON 2022] LORazepam (ATIVAN) tablet 0.5 mg, 0.5 mg, Oral, 3 times per day, Ranjan Marie MD  •  [] LORazepam (ATIVAN) tablet 2 mg, 2 mg, Oral, Q4H PRN **FOLLOWED BY** LORazepam (ATIVAN) tablet 1.5 mg, 1.5 mg, Oral, Q4H PRN **FOLLOWED BY** [START ON 2022] LORazepam (ATIVAN) tablet 1 mg, 1 mg, Oral, Q4H PRN **FOLLOWED BY** [START ON 2022] LORazepam (ATIVAN) tablet 0.5 mg, 0.5 mg, Oral, Q4H PRN, Ranajn Marie MD  •  magnesium hydroxide (MILK OF MAGNESIA) suspension 10 mL, 10 mL, Oral, Daily PRN, Ranjan Marie MD  •  melatonin tablet 5 mg, 5 mg, Oral, Nightly PRN, Ranjan Marie MD, 5 mg at 226  •  ondansetron (ZOFRAN) tablet 4 mg, 4 mg, Oral, Q6H PRN, Ranjan Marie MD  •  sodium chloride nasal spray 2 spray, 2 spray, Each Nare, PRN, Ranjan Marie MD  •  thiamine (VITAMIN B-1) tablet 100 mg, 100 mg, Oral, Daily, Dwaine Rhodes MD, 100 mg at 22 0908  •  traZODone (DESYREL) tablet 50 mg, 50 mg, Oral, Nightly PRN, Ranjan Marie MD    Reviewed chart, notes, vitals, labs and EKG personally reviewed.    ASSESSMENT & PLAN:      Alcohol use disorder, severe, dependence, in withdrawal  -Admitted for crisis stabilization and voluntary detox  -Ativan detox protocol with [facility]  -Supplement thiamine and multivitamin  -Encourage cessation and substance abuse treatment at discharge     UTI in  -Asymptomatic but UA indicative of UTI  -Continue cefdinir 300 mg twice daily for 5 days     Special precautions: Special Precautions Level 4 (q30 min checks)          Behavioral Health Treatment Plan and Problem List: I have reviewed and approved the Behavioral Health Treatment Plan and Problem list.  The patient has had a chance to review and agrees with the treatment plan.     Clinician:  Dwaine Rhodes MD  22  14:00 EST

## 2022-12-18 NOTE — PLAN OF CARE
Goal Outcome Evaluation:  Plan of Care Reviewed With: patient  Patient Agreement with Plan of Care: agrees     Progress: improving  Outcome Evaluation: Pt continues to stay in her room, lying in bed and sleeping the majority of the shift. Pt denies SI/HI/AVH and is cooperative with staff.

## 2022-12-19 PROCEDURE — 99231 SBSQ HOSP IP/OBS SF/LOW 25: CPT | Performed by: PSYCHIATRY & NEUROLOGY

## 2022-12-19 RX ORDER — LORAZEPAM 0.5 MG/1
0.5 TABLET ORAL EVERY 6 HOURS PRN
Status: DISCONTINUED | OUTPATIENT
Start: 2022-12-20 | End: 2022-12-20 | Stop reason: HOSPADM

## 2022-12-19 RX ORDER — LORAZEPAM 0.5 MG/1
0.5 TABLET ORAL
Status: COMPLETED | OUTPATIENT
Start: 2022-12-19 | End: 2022-12-19

## 2022-12-19 RX ADMIN — Medication 100 MG: at 08:01

## 2022-12-19 RX ADMIN — LORAZEPAM 1 MG: 1 TABLET ORAL at 08:01

## 2022-12-19 RX ADMIN — ACETAMINOPHEN 650 MG: 325 TABLET, FILM COATED ORAL at 08:01

## 2022-12-19 RX ADMIN — LORAZEPAM 0.5 MG: 0.5 TABLET ORAL at 21:18

## 2022-12-19 RX ADMIN — CEFDINIR 300 MG: 300 CAPSULE ORAL at 21:17

## 2022-12-19 RX ADMIN — HYDROXYZINE HYDROCHLORIDE 50 MG: 25 TABLET ORAL at 21:17

## 2022-12-19 RX ADMIN — LORAZEPAM 0.5 MG: 0.5 TABLET ORAL at 14:33

## 2022-12-19 RX ADMIN — CEFDINIR 300 MG: 300 CAPSULE ORAL at 08:01

## 2022-12-19 RX ADMIN — Medication 5 MG: at 21:18

## 2022-12-19 NOTE — PLAN OF CARE
Goal Outcome Evaluation:  Plan of Care Reviewed With: patient        Progress: no change  Outcome Evaluation: pt stayed in dayroom a lot at beginning of shift. Pt denies cravings. reports she wants to talk to her  about what to do at discharge

## 2022-12-19 NOTE — PLAN OF CARE
Goal Outcome Evaluation:        Problem: Adult Behavioral Health Plan of Care  Goal: Patient-Specific Goal (Individualization)  Outcome: Ongoing, Progressing  Flowsheets  Taken 12/17/2022 1352 by Shira Pickens LCSW  Patient-Specific Goals (Include Timeframe): Nanette to complete medical detox prior to discharge, identify 1-2 healthy coping skills to assist with relapse prevention during her 3 to 7 day hospital stay, engage in safe disposition planning prior to discharge.  Taken 12/17/2022 1348 by Shira Pickens LCSW  Patient Personal Strengths:   motivated for treatment   resourceful   expressive of needs   expressive of emotions   interests/hobbies   spiritual/[employer] support   positive educational history   positive vocational history   motivated for recovery  Patient Vulnerabilities: substance abuse/addiction  Taken 12/16/2022 1520 by Lenore Cintron RN  Individualized Care Needs: plans on going to [facility] after detox  Anxieties, Fears or Concerns: none reported  Goal: Optimized Coping Skills in Response to Life Stressors  Outcome: Ongoing, Progressing  Flowsheets (Taken 12/17/2022 1352 by Shira Pickens LCSW)  Optimized Coping Skills in Response to Life Stressors: making progress toward outcome  Intervention: Promote Effective Coping Strategies  Flowsheets (Taken 12/19/2022 1701)  Supportive Measures:   active listening utilized   counseling provided   decision-making supported   goal-setting facilitated   verbalization of feelings encouraged  Goal: Develops/Participates in Therapeutic [place] to Support Successful Transition  Outcome: Ongoing, Progressing  Flowsheets (Taken 12/17/2022 1352 by Shira Pickens LCSW)  Develops/Participates in Therapeutic [place] to Support Successful Transition: making progress toward outcome  Intervention: Foster Therapeutic [place]  Flowsheets (Taken 12/19/2022 1701)  Trust Relationship/Rapport:   care explained   choices  provided   reassurance provided   thoughts/feelings acknowledged   emotional support provided   empathic listening provided   questions answered   questions encouraged  Intervention: Mutually Develop Transition Plan  Flowsheets  Taken 12/19/2022 1701 by Melissa Dejesus MSW  Outpatient/Agency/Support Group Needs: residential services  Transition Support:   follow-up care discussed   community resources reviewed   crisis management plan promoted   crisis management plan verbalized   follow-up care coordinated  Anticipated Discharge Disposition: residential substance use unit  Taken 12/19/2022 1659 by Melissa Dejesus MSW  Discharge Coordination/Progress: Patient has [place] Medicaid. Patient to go to [facility] in [place] at discharge, agency to transport.  Transportation Anticipated: agency  Current Discharge Risk: substance use/abuse  Concerns to be Addressed:   coping/stress   mental health   substance/tobacco abuse/use  Readmission Within the Last 30 Days: no previous admission in last 30 days  Patient/Family Anticipated Services at Transition: rehabilitation services  Patient's Choice of Community Agency(s): [facility]  Patient/Family Anticipates Transition to: inpatient rehabilitation facility  Offered/Gave Vendor List: no  Taken 12/16/2022 1520 by Lenore Cintron RN  Transportation Concerns: (DUI on Monday- uncertain how this will impact license) other (see comments)      DATA: Therapist met with Patient individually on this date. Therapist introduced self as covering therapist and explained that Patient's primary therapist would not be present today. Patient agreeable to discuss treatment progress and discharge concerns.       CLINICAL MANUVERING/INTERVENTIONS: Assisted Patient in processing session content; acknowledged and normalized Patient’s thoughts, feelings, and concerns by utilizing a person-centered approach in efforts to build appropriate rapport and a positive therapeutic relationship  with open and honest communication. Allowed Patient to ventilate regarding current stressors and triggers for negative emotions and thoughts in a safe nonjudgmental environment with unconditional positive regard, active listening skills, and empathy. Therapist implemented motivational interviewing techniques to assist Patient with exploring personal growth and change and discussed distress tolerance skills, self soothing techniques, and applied cognitive behavioral strategies to facilitate identification of maladaptive patterns of thinking and behavior.Therapist utilized dialectical behavior techniques to teach and model emotional regulation and relaxation methods. Therapist assisted Patient with identifying and implementing healthier coping strategies. Therapist assisted Patient with safety planning; Patient agreed to continue honest communication with Treatment Team while inpatient and identify any SI/HI.  Patient encouraged to seek nearest ER or contact 911 if danger to self or others post discharge.     ASSESSMENT:    Therapist met with patient on this date. Patient continues to receive treatment for alcohol detox. Patient reports anxiety to be an 8 out of 10 and depression to be a 6. Patient denies current SI/HI/AVH. Patient is planning on discharge tomorrow and will be going to [facility] in [facility] to provide transportation.     PLAN: Patient will continue stabilization. Patient will continue to receive services offered by Treatment Team.    Assistance with Transportation will not be needed. Family member will provide. [facility] will provide.               Patient to go to [facility] in [place], [place] at discharge, likely tomorrow.

## 2022-12-19 NOTE — PLAN OF CARE
Problem: Adult Behavioral Health Plan of Care  Goal: Plan of Care Review  Outcome: Ongoing, Not Progressing  Flowsheets (Taken 12/19/2022 3447)  Progress: improving  Plan of Care Reviewed With: patient  Patient Agreement with Plan of Care: agrees  Outcome Evaluation: pt possibly discharged tomorrow   Goal Outcome Evaluation:  Plan of Care Reviewed With: patient  Patient Agreement with Plan of Care: agrees     Progress: improving  Outcome Evaluation: pt possibly discharged tomorrow

## 2022-12-19 NOTE — PROGRESS NOTES
INPATIENT PSYCHIATRIC PROGRESS NOTE    Name:  Nanette Pabon  :  1985  MRN:  [id number]  Visit Number:  [id number]  Length of stay:  3    SUBJECTIVE    CC/Focus of Exam: Alcohol use disorder    INTERVAL HISTORY: Patient continues to do well with detox and is not having any major withdrawal symptoms.  She continues to press for discharge but is on a high dose of medication.  I discussed this with her and we elected to shorten the taper and reduce her medication today to see if she tolerates it well and plan tentatively for discharge tomorrow.  She is having a lot of dysphoria and anxiety about being in the hospital and is stressed about the next steps.  She wants to get started on rehab as she is nervous about the amount of time that she will be spending in recovery.  She denies SI/HI/AVH.    Depression rating 6/10  Anxiety rating 8/10  Sleep: sleeping well  Withdrawal sx: 0  Cravin/10    Review of Systems   Constitutional: Negative.    HENT: Negative.    Respiratory: Negative.    Cardiovascular: Negative.    Gastrointestinal: Negative.    Musculoskeletal: Negative.        OBJECTIVE    Temp:  [96.7 °F (35.9 °C)-97.7 °F (36.5 °C)] 97.7 °F (36.5 °C)  Heart Rate:  [] 83  Resp:  [16-20] 18  BP: (103-139)/(59-87) 103/61    MENTAL STATUS EXAM:  Appearance: Casually dressed, good hygeine.   Cooperation: Cooperative  Psychomotor: No psychomotor agitation/retardation, No EPS, No motor tics  Speech: normal rate, amount.  Mood: \"It's just hard to be in this place \"   Affect: congruent, appropriate  Thought Content: goal directed, no delusional material present  Thought process: linear, organized.  Suicidality: No SI  Homicidality: No HI  Perception: No AH/VH  Insight: fair   Judgment: fair    Lab Results (last 24 hours)     ** No results found for the last 24 hours. **             Imaging Results (Last 24 Hours)     ** No results found for the last 24 hours. **             ECG/EMG Results (most recent)      Procedure Component Value Units Date/Time    ECG 12 Lead Other [[id number]] Collected: 22 1554     Updated: 22 1305     QT Interval 336 ms      QTC Interval 464 ms     Narrative:      Test Reason : Other~  Blood Pressure :   */*   mmHG  Vent. Rate : 115 BPM     Atrial Rate : 115 BPM     P-R Int : 124 ms          QRS Dur :  80 ms      QT Int : 336 ms       P-R-T Axes :  49  53  45 degrees     QTc Int : 464 ms    Sinus tachycardia  Otherwise normal ECG  When compared with ECG of 16-DEC-2022 15:54, (Unconfirmed)  No significant change was found  Confirmed by Lois Henley () on 2022 1:04:51 PM    Referred By: ANDREWS           Confirmed By: Lois Henley           ALLERGIES: Morphine      Current Facility-Administered Medications:   •  acetaminophen (TYLENOL) tablet 650 mg, 650 mg, Oral, Q6H PRN, Ranjan Marie MD, 650 mg at 22 0801  •  aluminum-magnesium hydroxide-simethicone (MAALOX MAX) 400-400-40 MG/5ML suspension 15 mL, 15 mL, Oral, Q6H PRN, Ranjan Marie MD  •  benzonatate (TESSALON) capsule 100 mg, 100 mg, Oral, TID PRN, Ranjan Marie MD  •  benztropine (COGENTIN) tablet 2 mg, 2 mg, Oral, Once PRN **OR** benztropine (COGENTIN) injection 1 mg, 1 mg, Intramuscular, Once PRN, Ranjan Marie MD  •  busPIRone (BUSPAR) tablet 5 mg, 5 mg, Oral, TID PRN, Ranjan Marie MD, 5 mg at 22 0831  •  cefdinir (OMNICEF) capsule 300 mg, 300 mg, Oral, Q12H, Armando Varghese MD, 300 mg at 22 0801  •  famotidine (PEPCID) tablet 20 mg, 20 mg, Oral, BID PRN, Ranjan Marie MD  •  hydrOXYzine (ATARAX) tablet 50 mg, 50 mg, Oral, Q6H PRN, Ranjan Marie MD  •  ibuprofen (ADVIL,MOTRIN) tablet 400 mg, 400 mg, Oral, Q6H PRN, Ranjan Marie MD, 400 mg at 22 0831  •  loperamide (IMODIUM) capsule 2 mg, 2 mg, Oral, Q2H PRN, Ranjan Marie MD  •  [] LORazepam (ATIVAN) tablet 2 mg, 2 mg, Oral, Q4H PRN **FOLLOWED BY** [] LORazepam (ATIVAN) tablet 1.5 mg, 1.5 mg, Oral, Q4H PRN **FOLLOWED BY**  LORazepam (ATIVAN) tablet 1 mg, 1 mg, Oral, Q4H PRN **FOLLOWED BY** [START ON 12/20/2022] LORazepam (ATIVAN) tablet 0.5 mg, 0.5 mg, Oral, Q4H PRN, Ranjan Marie MD  •  [COMPLETED] LORazepam (ATIVAN) tablet 2 mg, 2 mg, Oral, 3 times per day, 2 mg at 12/17/22 2126 **FOLLOWED BY** [COMPLETED] LORazepam (ATIVAN) tablet 1.5 mg, 1.5 mg, Oral, 3 times per day, 1.5 mg at 12/18/22 2118 **FOLLOWED BY** LORazepam (ATIVAN) tablet 0.5 mg, 0.5 mg, Oral, 3 times per day **FOLLOWED BY** [START ON 12/20/2022] LORazepam (ATIVAN) tablet 0.5 mg, 0.5 mg, Oral, Q6H PRN, Dwaine Rhodes MD  •  magnesium hydroxide (MILK OF MAGNESIA) suspension 10 mL, 10 mL, Oral, Daily PRN, Ranjan Marie MD  •  melatonin tablet 5 mg, 5 mg, Oral, Nightly PRN, Ranjan Marie MD, 5 mg at 12/18/22 2118  •  ondansetron (ZOFRAN) tablet 4 mg, 4 mg, Oral, Q6H PRN, Ranjan Marie MD  •  sodium chloride nasal spray 2 spray, 2 spray, Each Nare, PRN, Ranjan Marie MD  •  thiamine (VITAMIN B-1) tablet 100 mg, 100 mg, Oral, Daily, Dwaine Rhodes MD, 100 mg at 12/19/22 0801  •  traZODone (DESYREL) tablet 50 mg, 50 mg, Oral, Nightly PRN, Ranjan Marie MD    Reviewed chart, notes, vitals, labs and EKG personally reviewed.    ASSESSMENT & PLAN:      Alcohol use disorder, severe, dependence, in withdrawal  -Admitted for crisis stabilization and voluntary detox  -Ativan detox protocol with [facility]  -Supplement thiamine and multivitamin  -Encourage cessation and substance abuse treatment at discharge  -We will shorten taper with tentative and tomorrow and see how patient tolerates this as she is not currently having major withdrawal symptoms     UTI in  -Asymptomatic but UA indicative of UTI  -Continue cefdinir 300 mg twice daily for 5 days     Special precautions: Special Precautions Level 4 (q30 min checks)          Behavioral Health Treatment Plan and Problem List: I have reviewed and approved the Behavioral Health Treatment Plan and Problem list.  The patient has had  a chance to review and agrees with the treatment plan.     Clinician:  Dwaine Rhodes MD  12/19/22  12:05 EST

## 2022-12-20 VITALS
RESPIRATION RATE: 18 BRPM | DIASTOLIC BLOOD PRESSURE: 82 MMHG | HEART RATE: 99 BPM | HEIGHT: 62 IN | WEIGHT: 121.6 LBS | TEMPERATURE: 98.1 F | OXYGEN SATURATION: 97 % | SYSTOLIC BLOOD PRESSURE: 139 MMHG | BODY MASS INDEX: 22.38 KG/M2

## 2022-12-20 PROBLEM — F10.20 ALCOHOL USE DISORDER, SEVERE, DEPENDENCE: Status: ACTIVE | Noted: 2022-12-16

## 2022-12-20 PROBLEM — N39.0 UTI (URINARY TRACT INFECTION): Status: ACTIVE | Noted: 2022-12-20

## 2022-12-20 PROCEDURE — 99239 HOSP IP/OBS DSCHRG MGMT >30: CPT | Performed by: PSYCHIATRY & NEUROLOGY

## 2022-12-20 RX ORDER — CHOLECALCIFEROL (VITAMIN D3) 125 MCG
5 CAPSULE ORAL NIGHTLY PRN
Qty: 30 TABLET | Refills: 0 | Status: SHIPPED | OUTPATIENT
Start: 2022-12-20

## 2022-12-20 RX ORDER — CEFDINIR 300 MG/1
300 CAPSULE ORAL EVERY 12 HOURS SCHEDULED
Qty: 1 CAPSULE | Refills: 0 | Status: SHIPPED | OUTPATIENT
Start: 2022-12-20 | End: 2022-12-21

## 2022-12-20 RX ORDER — BUSPIRONE HYDROCHLORIDE 5 MG/1
5 TABLET ORAL 3 TIMES DAILY PRN
Qty: 90 TABLET | Refills: 0 | Status: SHIPPED | OUTPATIENT
Start: 2022-12-20 | End: 2023-01-05

## 2022-12-20 RX ADMIN — CEFDINIR 300 MG: 300 CAPSULE ORAL at 08:09

## 2022-12-20 RX ADMIN — BUSPIRONE HYDROCHLORIDE 5 MG: 10 TABLET ORAL at 08:09

## 2022-12-20 RX ADMIN — ACETAMINOPHEN 650 MG: 325 TABLET, FILM COATED ORAL at 08:09

## 2022-12-20 RX ADMIN — Medication 100 MG: at 08:09

## 2022-12-20 NOTE — PAYOR COMM NOTE
Tatyana Pabon (37 y.o. Female)     Date of Birth   1985    Social Security Number       Address   [address] [address]    Home Phone   [phone]    MRN   [id number]       Shinto   Unknown    Marital Status                               Admission Date   22    Admission Type   Emergency    Admitting Provider   Ranjan Marie MD    Attending Provider       Department, Room/Bed   [facility] ADULT CD, 1045/1S       Discharge Date   2022    Discharge Disposition   Rehab Facility or Unit (DC - External)    Discharge Destination                               Attending Provider: (none)   Allergies: Morphine    Isolation: None   Infection: None   Code Status: CPR    Ht: 157.5 cm (62\")   Wt: 55.2 kg (121 lb 9.6 oz)    Admission Cmt: None   Principal Problem: None                Active Insurance as of 2022     Primary Coverage     Payor Plan Insurance Group Employer/Plan Group    ANTHEM MEDICAID ANTHEM MEDICAID [id number]     Payor Plan Address Payor Plan Phone Number Payor Plan Fax Number Effective Dates    [address] [address] [phone]  2022 - None Entered    [facility] [address]       Subscriber Name Subscriber Birth Date Member ID       TATYANA PABON 1985 [id number]                 Emergency Contacts      (Rel.) Home Phone Work Phone Mobile Phone    Jeb Pabno (Spouse) [phone] -- --        RE: Tatyana Pabon  : 1985    PLEASE ATTACH THIS DISCHARGE INFORMATION TO AUTHORIZATION [id number]    Discharge date: 2022    Discharge diagnosis:   Active Problems:    Alcohol use disorder, severe, dependence (F10.20)    UTI (urinary tract infection)    Follow Up:      [facility]  [email or web address]  [address] [place] [address]  [phone]     Return at discharge. 2022    Discharge Summary:     Edison Nieto MD   Physician  Psychiatry  Discharge Summary       Signed  Date of Service:  22  Creation Time:  22     Signed              PSYCHIATRIC DISCHARGE SUMMARY      Patient Identification:  Name:  Nanette Pabon  Age:  37 y.o.  Sex:  female  :  1985  MRN:  [id number]  Visit Number:  [id number]     Date of Admission:2022   Date of Discharge:  2022     Discharge Diagnosis:  Active Problems:    Alcohol use disorder, severe, dependence (HCC)    UTI (urinary tract infection)        Admission Diagnosis:  Chemical dependency (HCC) [F19.20]  Alcohol abuse [F10.10]                Hospital Course  Patient is a 37 y.o. female presented with alcohol dependence.  Admitted for medically assisted detox.  Admission labs significant for nitrite positive UTI, for which cefdinir was started.  Patient placed on Ativan detox protocol.  Patient completed detox protocol with no acute issues or setbacks.  She plans to admit to [facility] following discharge today.  Outpatient care ascertained.     On the day of discharge, patient denied SI, HI or AVH. Patient was stable and appropriate by the conclusion of this admission, denying significant symptoms of mood, psychotic or thought disorder. Patient showed improvement of presenting symptoms and was deemed appropriate for discharge today.     Mental Status Exam upon discharge:   Mood \"better\"   Affect-congruent, appropriate, stable  Thought Content-goal directed, no delusional material present  Thought process-linear, organized.  Suicidality: No SI  Homicidality: No HI  Perception: No AH/VH     Procedures Performed        Consults:       Consults      No orders found from 2022 to 2022.             Pertinent Test Results:       Lab Results (last 7 days)      ** No results found for the last 168 hours. **             Condition on Discharge:  improved     Vital Signs  Temp:  [97.6 °F (36.4 °C)-98.6 °F (37 °C)] 98.1 °F (36.7 °C)  Heart Rate:  [] 99  Resp:  [16-18] 18  BP: (108-140)/(62-95)  139/82     Discharge Disposition:  Rehab Facility or Unit (DC - External)     Discharge Medications:           Discharge Medications            New Medications      Instructions Start Date   cefdinir 300 MG capsule  Commonly known as: OMNICEF    300 mg, Oral, Every 12 Hours Scheduled                      Changes to Medications      Instructions Start Date   busPIRone 5 MG tablet  Commonly known as: BUSPAR  What changed: reasons to take this    5 mg, Oral, 3 Times Daily PRN        melatonin 5 MG tablet tablet  What changed: reasons to take this    5 mg, Oral, Nightly PRN                  Discharge Diet: Normal     Activity at Discharge: Normal     Follow-up Appointments  No future appointments.     Test Results Pending at Discharge  None      Time: I spent greater than 30 minutes on this discharge activity which included: face-to-face encounter with the patient, reviewing the data in the system, coordination of the care with the nursing staff as well as consultants, documentation, and entering orders.       Clinician:   Edison Nieto MD  12/20/22  09:55 EST

## 2022-12-20 NOTE — PLAN OF CARE
Goal Outcome Evaluation:        Problem: Adult Behavioral Health Plan of Care  Goal: Patient-Specific Goal (Individualization)  Outcome: Adequate for Care Transition  Flowsheets  Taken 12/17/2022 1352 by Shira Pickens LCSW  Patient-Specific Goals (Include Timeframe): Nanette to complete medical detox prior to discharge, identify 1-2 healthy coping skills to assist with relapse prevention during her 3 to 7 day hospital stay, engage in safe disposition planning prior to discharge.  Taken 12/17/2022 1348 by Shira Pickens LCSW  Patient Personal Strengths:   motivated for treatment   resourceful   expressive of needs   expressive of emotions   interests/hobbies   spiritual/Caodaism support   positive educational history   positive vocational history   motivated for recovery  Patient Vulnerabilities: substance abuse/addiction  Taken 12/16/2022 1520 by Lenore Cintron RN  Individualized Care Needs: plans on going to [facility] after detox  Anxieties, Fears or Concerns: none reported  Goal: Optimized Coping Skills in Response to Life Stressors  Outcome: Adequate for Care Transition  Flowsheets (Taken 12/20/2022 1004)  Optimized Coping Skills in Response to Life Stressors: achieves outcome  Goal: Develops/Participates in Therapeutic Simms to Support Successful Transition  Outcome: Adequate for Care Transition  Flowsheets (Taken 12/20/2022 1004)  Develops/Participates in Therapeutic Simms to Support Successful Transition: achieves outcome  Intervention: Foster Therapeutic Simms  Flowsheets (Taken 12/19/2022 1701)  Trust Relationship/Rapport:   care explained   choices provided   reassurance provided   thoughts/feelings acknowledged   emotional support provided   empathic listening provided   questions answered   questions encouraged  Intervention: Mutually Develop Transition Plan  Flowsheets  Taken 12/19/2022 1701 by Melissa Dejesus MSW  Outpatient/Agency/Support Group Needs: residential  services  Transition Support:   follow-up care discussed   community resources reviewed   crisis management plan promoted   crisis management plan verbalized   follow-up care coordinated  Anticipated Discharge Disposition: residential substance use unit  Taken 12/19/2022 4859 by Melissa Dejesus MSW  Discharge Coordination/Progress: Patient has [place] Medicaid. Patient to go to [facility] in [place] at discharge, agency to transport.  Transportation Anticipated: agency  Current Discharge Risk: substance use/abuse  Concerns to be Addressed:   coping/stress   mental health   substance/tobacco abuse/use  Readmission Within the Last 30 Days: no previous admission in last 30 days  Patient/Family Anticipated Services at Transition: rehabilitation services  Patient's Choice of Community Agency(s): [facility]  Patient/Family Anticipates Transition to: inpatient rehabilitation facility  Offered/Gave Vendor List: no  Taken 12/16/2022 1520 by Lenore Cintron RN  Transportation Concerns: (DUI on Monday- uncertain how this will impact license) other (see comments)         DATA: Therapist met with Patient individually on this date. Therapist introduced self as covering therapist and explained that Patient's primary therapist would not be present today. Patient agreeable to discuss treatment progress and discharge concerns.       CLINICAL MANUVERING/INTERVENTIONS: Assisted Patient in processing session content; acknowledged and normalized Patient’s thoughts, feelings, and concerns by utilizing a person-centered approach in efforts to build appropriate rapport and a positive therapeutic relationship with open and honest communication. Allowed Patient to ventilate regarding current stressors and triggers for negative emotions and thoughts in a safe nonjudgmental environment with unconditional positive regard, active listening skills, and empathy. Therapist implemented motivational interviewing techniques to assist  Patient with exploring personal growth and change and discussed distress tolerance skills, self soothing techniques, and applied cognitive behavioral strategies to facilitate identification of maladaptive patterns of thinking and behavior.Therapist utilized dialectical behavior techniques to teach and model emotional regulation and relaxation methods. Therapist assisted Patient with identifying and implementing healthier coping strategies. Therapist assisted Patient with safety planning; Patient agreed to continue honest communication with Treatment Team while inpatient and identify any SI/HI.  Patient encouraged to seek nearest ER or contact 911 if danger to self or others post discharge.     ASSESSMENT:    Patient discharging to [facility] on this date, detox completed. Patient calm and cooperative, oriented x4. Patient denies current SI/HI/AVH. Patient reports improvement in anxiety, depression, and withdrawal symptoms. Patient denies having any needs or concerns at this time. [facility] will provide transportation for patient.     Assisted patient in identifying healthy coping skills and relapse prevention. Assisted patient in identifying risk factors which would indicate the need for higher level of care including thoughts to harm self or others and/or self-harming behavior. Encouraged patient to call 911 or present to the nearest emergency room should any of these events occur. Discussed crisis intervention services and means to access. Patient adamantly and convincingly denies current suicidal or homicidal or perceptual disturbance.     PLAN: Patient will continue stabilization. Patient will continue to receive services offered by Treatment Team.     Assistance with Transportation will not be needed. Family member will provide. [facility] will provide.            Patient discharging to [facility] on this date, agency to provide transportation.

## 2022-12-20 NOTE — PLAN OF CARE
Goal Outcome Evaluation:  Plan of Care Reviewed With: patient        Progress: improving  Outcome Evaluation: Pt calm and cooperative. this shift. Pt reports possible discharge tomorrow.

## 2022-12-20 NOTE — PLAN OF CARE
Problem: Adult Behavioral Health Plan of Care  Goal: Plan of Care Review  Outcome: Adequate for Care Transition  Flowsheets (Taken 12/20/2022 1129)  Progress: improving  Plan of Care Reviewed With: patient  Patient Agreement with Plan of Care: agrees  Outcome Evaluation: Pt is being discharged today.   Goal Outcome Evaluation:  Plan of Care Reviewed With: patient  Patient Agreement with Plan of Care: agrees     Progress: improving  Outcome Evaluation: Pt is being discharged today.

## 2022-12-20 NOTE — DISCHARGE SUMMARY
PSYCHIATRIC DISCHARGE SUMMARY     Patient Identification:  Name:  Nanette Pabon  Age:  37 y.o.  Sex:  female  :  1985  MRN:  [id number]  Visit Number:  [id number]    Date of Admission:2022   Date of Discharge:  2022    Discharge Diagnosis:  Active Problems:    Alcohol use disorder, severe, dependence (HCC)    UTI (urinary tract infection)      Admission Diagnosis:  Chemical dependency (HCC) [F19.20]  Alcohol abuse [F10.10]     Hospital Course  Patient is a 37 y.o. female presented with alcohol dependence.  Admitted for medically assisted detox.  Admission labs significant for nitrite positive UTI, for which cefdinir was started.  Patient placed on Ativan detox protocol.  Patient completed detox protocol with no acute issues or setbacks.  She plans to [facility] to [facility] following discharge today.  Outpatient care ascertained.    On the day of discharge, patient denied SI, HI or AVH. Patient was stable and appropriate by the conclusion of this admission, denying significant symptoms of mood, psychotic or thought disorder. Patient showed improvement of presenting symptoms and was deemed appropriate for discharge today.    Mental Status Exam upon discharge:   Mood \"better\"   Affect-congruent, appropriate, stable  Thought Content-goal directed, no delusional material present  Thought process-linear, organized.  Suicidality: No SI  Homicidality: No HI  Perception: No AH/VH    Procedures Performed         Consults:   Consults     No orders found from 2022 to 2022.          Pertinent Test Results:   Lab Results (last 7 days)     ** No results found for the last 168 hours. **          Condition on Discharge:  improved    Vital Signs  Temp:  [97.6 °F (36.4 °C)-98.6 °F (37 °C)] 98.1 °F (36.7 °C)  Heart Rate:  [] 99  Resp:  [16-18] 18  BP: (108-140)/(62-95) 139/82    Discharge Disposition:  Rehab Facility or Unit (DC - External)    Discharge Medications:     Discharge Medications       New Medications      Instructions Start Date   cefdinir 300 MG capsule  Commonly known as: OMNICEF   300 mg, Oral, Every 12 Hours Scheduled         Changes to Medications      Instructions Start Date   busPIRone 5 MG tablet  Commonly known as: BUSPAR  What changed: reasons to take this   5 mg, Oral, 3 Times Daily PRN      melatonin 5 MG tablet tablet  What changed: reasons to take this   5 mg, Oral, Nightly PRN             Discharge Diet: Normal    Activity at Discharge: Normal    Follow-up Appointments  No future appointments.      Test Results Pending at Discharge  None     Time: I spent greater than 30 minutes on this discharge activity which included: face-to-face encounter with the patient, reviewing the data in the system, coordination of the care with the nursing staff as well as consultants, documentation, and entering orders.      Clinician:   Edison Nieto MD  12/20/22  09:55 EST

## 2023-01-05 ENCOUNTER — TELEMEDICINE (OUTPATIENT)
Dept: PSYCHIATRY | Facility: CLINIC | Age: 38
End: 2023-01-05
Payer: MEDICAID

## 2023-01-05 VITALS
SYSTOLIC BLOOD PRESSURE: 134 MMHG | HEIGHT: 62 IN | DIASTOLIC BLOOD PRESSURE: 83 MMHG | HEART RATE: 81 BPM | TEMPERATURE: 99 F | BODY MASS INDEX: 23.85 KG/M2 | WEIGHT: 129.6 LBS

## 2023-01-05 DIAGNOSIS — F33.1 MAJOR DEPRESSIVE DISORDER, RECURRENT EPISODE, MODERATE: ICD-10-CM

## 2023-01-05 DIAGNOSIS — F41.1 GENERALIZED ANXIETY DISORDER: ICD-10-CM

## 2023-01-05 DIAGNOSIS — F10.20 ALCOHOL USE DISORDER, SEVERE, DEPENDENCE: Primary | ICD-10-CM

## 2023-01-05 PROCEDURE — 90792 PSYCH DIAG EVAL W/MED SRVCS: CPT | Performed by: NURSE PRACTITIONER

## 2023-01-05 RX ORDER — VENLAFAXINE HYDROCHLORIDE 37.5 MG/1
37.5 CAPSULE, EXTENDED RELEASE ORAL DAILY
Qty: 30 CAPSULE | Refills: 0 | Status: SHIPPED | OUTPATIENT
Start: 2023-01-05

## 2023-01-05 RX ORDER — PROPRANOLOL HYDROCHLORIDE 10 MG/1
10 TABLET ORAL 2 TIMES DAILY PRN
Qty: 60 TABLET | Refills: 0 | Status: SHIPPED | OUTPATIENT
Start: 2023-01-05
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
## 2024-03-09 ENCOUNTER — APPOINTMENT (OUTPATIENT)
Dept: CT IMAGING | Facility: HOSPITAL | Age: 39
End: 2024-03-09
Payer: MEDICAID

## 2024-03-09 ENCOUNTER — HOSPITAL ENCOUNTER (EMERGENCY)
Facility: HOSPITAL | Age: 39
Discharge: HOME | End: 2024-03-10
Attending: FAMILY MEDICINE
Payer: MEDICAID

## 2024-03-09 VITALS
BODY MASS INDEX: 23.92 KG/M2 | DIASTOLIC BLOOD PRESSURE: 87 MMHG | RESPIRATION RATE: 18 BRPM | OXYGEN SATURATION: 98 % | HEIGHT: 62 IN | SYSTOLIC BLOOD PRESSURE: 145 MMHG | TEMPERATURE: 98.1 F | WEIGHT: 130 LBS | HEART RATE: 111 BPM

## 2024-03-09 DIAGNOSIS — F10.920 ALCOHOLIC INTOXICATION WITHOUT COMPLICATION: Primary | ICD-10-CM

## 2024-03-09 LAB
ALBUMIN SERPL-MCNC: 4.7 G/DL (ref 3.5–5.2)
ALBUMIN/GLOB SERPL: 1.6 G/DL
ALP SERPL-CCNC: 91 U/L (ref 39–117)
ALT SERPL W P-5'-P-CCNC: 27 U/L (ref 1–33)
AMPHET+METHAMPHET UR QL: NEGATIVE
AMPHETAMINES UR QL: NEGATIVE
ANION GAP SERPL CALCULATED.3IONS-SCNC: 17.6 MMOL/L (ref 5–15)
AST SERPL-CCNC: 31 U/L (ref 1–32)
B-HCG UR QL: NEGATIVE
BACTERIA UR QL AUTO: ABNORMAL /HPF
BARBITURATES UR QL SCN: NEGATIVE
BASOPHILS # BLD AUTO: 0.11 10*3/MM3 (ref 0–0.2)
BASOPHILS NFR BLD AUTO: 1.2 % (ref 0–1.5)
BENZODIAZ UR QL SCN: NEGATIVE
BILIRUB SERPL-MCNC: 0.2 MG/DL (ref 0–1.2)
BILIRUB UR QL STRIP: NEGATIVE
BUN SERPL-MCNC: 7 MG/DL (ref 6–20)
BUN/CREAT SERPL: 9.7 (ref 7–25)
BUPRENORPHINE SERPL-MCNC: NEGATIVE NG/ML
CALCIUM SPEC-SCNC: 8.7 MG/DL (ref 8.6–10.5)
CANNABINOIDS SERPL QL: NEGATIVE
CHLORIDE SERPL-SCNC: 100 MMOL/L (ref 98–107)
CLARITY UR: ABNORMAL
CO2 SERPL-SCNC: 23.4 MMOL/L (ref 22–29)
COCAINE UR QL: NEGATIVE
COLOR UR: YELLOW
CREAT SERPL-MCNC: 0.72 MG/DL (ref 0.57–1)
DEPRECATED RDW RBC AUTO: 43.8 FL (ref 37–54)
EGFRCR SERPLBLD CKD-EPI 2021: 109.9 ML/MIN/1.73
EOSINOPHIL # BLD AUTO: 0.05 10*3/MM3 (ref 0–0.4)
EOSINOPHIL NFR BLD AUTO: 0.6 % (ref 0.3–6.2)
ERYTHROCYTE [DISTWIDTH] IN BLOOD BY AUTOMATED COUNT: 17.8 % (ref 12.3–15.4)
ETHANOL BLD-MCNC: 115 MG/DL (ref 0–10)
ETHANOL BLD-MCNC: 271 MG/DL (ref 0–10)
ETHANOL BLD-MCNC: 77 MG/DL (ref 0–10)
ETHANOL UR QL: 0.08 %
ETHANOL UR QL: 0.12 %
ETHANOL UR QL: 0.27 %
FENTANYL UR-MCNC: NEGATIVE NG/ML
GLOBULIN UR ELPH-MCNC: 3 GM/DL
GLUCOSE SERPL-MCNC: 89 MG/DL (ref 65–99)
GLUCOSE UR STRIP-MCNC: NEGATIVE MG/DL
HCT VFR BLD AUTO: 38.9 % (ref 34–46.6)
HGB BLD-MCNC: 11.7 G/DL (ref 12–15.9)
HGB UR QL STRIP.AUTO: ABNORMAL
HOLD SPECIMEN: NORMAL
HYALINE CASTS UR QL AUTO: ABNORMAL /LPF
IMM GRANULOCYTES # BLD AUTO: 0.11 10*3/MM3 (ref 0–0.05)
IMM GRANULOCYTES NFR BLD AUTO: 1.2 % (ref 0–0.5)
KETONES UR QL STRIP: NEGATIVE
LEUKOCYTE ESTERASE UR QL STRIP.AUTO: ABNORMAL
LIPASE SERPL-CCNC: 45 U/L (ref 13–60)
LYMPHOCYTES # BLD AUTO: 3.16 10*3/MM3 (ref 0.7–3.1)
LYMPHOCYTES NFR BLD AUTO: 35.6 % (ref 19.6–45.3)
MAGNESIUM SERPL-MCNC: 2.3 MG/DL (ref 1.6–2.6)
MCH RBC QN AUTO: 21.2 PG (ref 26.6–33)
MCHC RBC AUTO-ENTMCNC: 30.1 G/DL (ref 31.5–35.7)
MCV RBC AUTO: 70.6 FL (ref 79–97)
METHADONE UR QL SCN: NEGATIVE
MONOCYTES # BLD AUTO: 0.47 10*3/MM3 (ref 0.1–0.9)
MONOCYTES NFR BLD AUTO: 5.3 % (ref 5–12)
NEUTROPHILS NFR BLD AUTO: 4.97 10*3/MM3 (ref 1.7–7)
NEUTROPHILS NFR BLD AUTO: 56.1 % (ref 42.7–76)
NITRITE UR QL STRIP: NEGATIVE
NRBC BLD AUTO-RTO: 0 /100 WBC (ref 0–0.2)
OPIATES UR QL: NEGATIVE
OXYCODONE UR QL SCN: NEGATIVE
PCP UR QL SCN: NEGATIVE
PH UR STRIP.AUTO: 6 [PH] (ref 5–8)
PLATELET # BLD AUTO: 352 10*3/MM3 (ref 140–450)
PMV BLD AUTO: 8.6 FL (ref 6–12)
POTASSIUM SERPL-SCNC: 4.1 MMOL/L (ref 3.5–5.2)
PROT SERPL-MCNC: 7.7 G/DL (ref 6–8.5)
PROT UR QL STRIP: ABNORMAL
RBC # BLD AUTO: 5.51 10*6/MM3 (ref 3.77–5.28)
RBC # UR STRIP: ABNORMAL /HPF
REF LAB TEST METHOD: ABNORMAL
SODIUM SERPL-SCNC: 141 MMOL/L (ref 136–145)
SP GR UR STRIP: 1.01 (ref 1–1.03)
SQUAMOUS #/AREA URNS HPF: ABNORMAL /HPF
TRICYCLICS UR QL SCN: NEGATIVE
TSH SERPL DL<=0.05 MIU/L-ACNC: 0.61 UIU/ML (ref 0.27–4.2)
UROBILINOGEN UR QL STRIP: ABNORMAL
WBC # UR STRIP: ABNORMAL /HPF
WBC NRBC COR # BLD AUTO: 8.87 10*3/MM3 (ref 3.4–10.8)
WHOLE BLOOD HOLD COAG: NORMAL
WHOLE BLOOD HOLD SPECIMEN: NORMAL

## 2024-03-09 PROCEDURE — 25010000002 LORAZEPAM PER 2 MG: Performed by: EMERGENCY MEDICINE

## 2024-03-09 PROCEDURE — 96376 TX/PRO/DX INJ SAME DRUG ADON: CPT

## 2024-03-09 PROCEDURE — 96361 HYDRATE IV INFUSION ADD-ON: CPT

## 2024-03-09 PROCEDURE — 25010000002 THIAMINE HCL 200 MG/2ML SOLUTION: Performed by: PHYSICIAN ASSISTANT

## 2024-03-09 PROCEDURE — 96375 TX/PRO/DX INJ NEW DRUG ADDON: CPT

## 2024-03-09 PROCEDURE — 80307 DRUG TEST PRSMV CHEM ANLYZR: CPT | Performed by: PHYSICIAN ASSISTANT

## 2024-03-09 PROCEDURE — 96365 THER/PROPH/DIAG IV INF INIT: CPT

## 2024-03-09 PROCEDURE — 74177 CT ABD & PELVIS W/CONTRAST: CPT

## 2024-03-09 PROCEDURE — 99285 EMERGENCY DEPT VISIT HI MDM: CPT

## 2024-03-09 PROCEDURE — 74177 CT ABD & PELVIS W/CONTRAST: CPT | Performed by: RADIOLOGY

## 2024-03-09 PROCEDURE — 36415 COLL VENOUS BLD VENIPUNCTURE: CPT

## 2024-03-09 PROCEDURE — 85025 COMPLETE CBC W/AUTO DIFF WBC: CPT | Performed by: PHYSICIAN ASSISTANT

## 2024-03-09 PROCEDURE — 82077 ASSAY SPEC XCP UR&BREATH IA: CPT | Performed by: EMERGENCY MEDICINE

## 2024-03-09 PROCEDURE — 80053 COMPREHEN METABOLIC PANEL: CPT | Performed by: PHYSICIAN ASSISTANT

## 2024-03-09 PROCEDURE — 83735 ASSAY OF MAGNESIUM: CPT | Performed by: PHYSICIAN ASSISTANT

## 2024-03-09 PROCEDURE — 25810000003 SODIUM CHLORIDE 0.9 % SOLUTION: Performed by: PHYSICIAN ASSISTANT

## 2024-03-09 PROCEDURE — 81001 URINALYSIS AUTO W/SCOPE: CPT | Performed by: PHYSICIAN ASSISTANT

## 2024-03-09 PROCEDURE — 25510000001 IOPAMIDOL 61 % SOLUTION: Performed by: PHYSICIAN ASSISTANT

## 2024-03-09 PROCEDURE — 84443 ASSAY THYROID STIM HORMONE: CPT | Performed by: PHYSICIAN ASSISTANT

## 2024-03-09 PROCEDURE — 81025 URINE PREGNANCY TEST: CPT | Performed by: PHYSICIAN ASSISTANT

## 2024-03-09 PROCEDURE — 82077 ASSAY SPEC XCP UR&BREATH IA: CPT | Performed by: PHYSICIAN ASSISTANT

## 2024-03-09 PROCEDURE — 83690 ASSAY OF LIPASE: CPT | Performed by: PHYSICIAN ASSISTANT

## 2024-03-09 RX ORDER — SODIUM CHLORIDE 0.9 % (FLUSH) 0.9 %
10 SYRINGE (ML) INJECTION AS NEEDED
Status: DISCONTINUED | OUTPATIENT
Start: 2024-03-09 | End: 2024-03-10 | Stop reason: HOSPADM

## 2024-03-09 RX ORDER — SODIUM CHLORIDE 9 MG/ML
1000 INJECTION, SOLUTION INTRAVENOUS ONCE
Status: COMPLETED | OUTPATIENT
Start: 2024-03-09 | End: 2024-03-09

## 2024-03-09 RX ORDER — THIAMINE HYDROCHLORIDE 100 MG/ML
200 INJECTION, SOLUTION INTRAMUSCULAR; INTRAVENOUS ONCE
Status: COMPLETED | OUTPATIENT
Start: 2024-03-09 | End: 2024-03-09

## 2024-03-09 RX ORDER — LORAZEPAM 2 MG/ML
1 INJECTION INTRAMUSCULAR ONCE
Status: COMPLETED | OUTPATIENT
Start: 2024-03-09 | End: 2024-03-09

## 2024-03-09 RX ADMIN — SODIUM CHLORIDE 1000 ML: 9 INJECTION, SOLUTION INTRAVENOUS at 18:00

## 2024-03-09 RX ADMIN — THIAMINE HYDROCHLORIDE 200 MG: 100 INJECTION, SOLUTION INTRAMUSCULAR; INTRAVENOUS at 18:42

## 2024-03-09 RX ADMIN — FOLIC ACID 1 MG: 5 INJECTION, SOLUTION INTRAMUSCULAR; INTRAVENOUS; SUBCUTANEOUS at 18:43

## 2024-03-09 RX ADMIN — LORAZEPAM 1 MG: 2 INJECTION, SOLUTION INTRAMUSCULAR; INTRAVENOUS at 17:58

## 2024-03-09 RX ADMIN — IOPAMIDOL 80 ML: 612 INJECTION, SOLUTION INTRAVENOUS at 20:05

## 2024-03-09 RX ADMIN — LORAZEPAM 1 MG: 2 INJECTION, SOLUTION INTRAMUSCULAR; INTRAVENOUS at 21:26

## 2024-03-10 NOTE — NURSING NOTE
Spoke with Dr. Nieto, discussed assessment and labs, patient has only been daily drinking for the last week. Per Dr. Nieto, with binge drinking such as this detox is not typically required, and patient does not currently meet admission criteria. Instruct patient to follow up with primary care provider on Monday. If patient is interested in ILP/PHP program provide information, and provide information on rehabs and outpatient resources. [id number]

## 2024-03-10 NOTE — NURSING NOTE
Patient presented to intake requesting detox from alcohol. She reports that she has been binge drinking for over a week, ranging from a pint to a 1/5th of whiskey daily. She reports that she is going through a divorce, and her mother is dying. Denied SI/HI/AVH. CIWA 8.  Patient last admitted in Dec. 2022    2mg of ativan given since in ED.     UDS negative.

## 2024-03-10 NOTE — ED PROVIDER NOTES
Subjective   History of Present Illness  38-year-old female patient with a past medical history of alcoholism, anxiety, and depression presents to the emergency room today for alcohol detox.  Patient states her last drink was around noon today.  Patient states she been sober for a year and relapsed on Monday.  Patient states has been drinking every day since then.  She denies any suicidal or homicidal ideations.  Patient denies any visual or auditory hallucinations.  She denies any withdrawal symptoms currently.  Patient states that she is going through a lot currently.  States that her mother is dying and she is going through a divorce with her .    History provided by:  Patient   used: No        Review of Systems   Constitutional: Negative.    HENT: Negative.     Eyes: Negative.    Respiratory: Negative.     Cardiovascular: Negative.    Gastrointestinal: Negative.    Endocrine: Negative.    Genitourinary: Negative.    Musculoskeletal: Negative.    Skin: Negative.    Allergic/Immunologic: Negative.    Neurological: Negative.    Hematological: Negative.    Psychiatric/Behavioral: Negative.     All other systems reviewed and are negative.      Past Medical History:   Diagnosis Date    Alcoholism     Anxiety     Depression     Fracture left tibia     about 7 weeks ago// end of 2022    MVP (mitral valve prolapse)        Allergies   Allergen Reactions    Morphine Hives and GI Intolerance       Past Surgical History:   Procedure Laterality Date     SECTION      I have had 5 c sections in the past       Family History   Problem Relation Age of Onset    Breast cancer Mother     Anxiety disorder Father     Alcohol abuse Father     Anxiety disorder Brother     Depression Brother     Suicidality Brother     Alcohol abuse Brother        Social History     Socioeconomic History    Marital status: Legally     Number of children: 4    Years of education: 16    Highest education  level: Associate degree: academic program   Tobacco Use    Smoking status: Never    Smokeless tobacco: Never   Vaping Use    Vaping status: Never Used   Substance and Sexual Activity    Alcohol use: Yes     Comment: I drink a fifth of alcohol per day.    Drug use: Not Currently     Types: Marijuana     Comment: I smoke occasional. I smoked a joint a month ago rare.    Sexual activity: Yes     Partners: Male     Birth control/protection: I.U.D.           Objective   Physical Exam  Vitals and nursing note reviewed.   Constitutional:       Appearance: Normal appearance. She is normal weight.   HENT:      Head: Normocephalic and atraumatic.      Right Ear: External ear normal.      Left Ear: External ear normal.      Nose: Nose normal.      Mouth/Throat:      Mouth: Mucous membranes are moist.      Pharynx: Oropharynx is clear.   Eyes:      Extraocular Movements: Extraocular movements intact.      Conjunctiva/sclera: Conjunctivae normal.      Pupils: Pupils are equal, round, and reactive to light.   Cardiovascular:      Rate and Rhythm: Normal rate and regular rhythm.      Pulses: Normal pulses.      Heart sounds: Normal heart sounds.   Pulmonary:      Effort: Pulmonary effort is normal.      Breath sounds: Normal breath sounds.   Abdominal:      General: Abdomen is flat. Bowel sounds are normal.      Palpations: Abdomen is soft.   Musculoskeletal:         General: Normal range of motion.      Cervical back: Normal range of motion and neck supple.   Skin:     General: Skin is warm and dry.      Capillary Refill: Capillary refill takes less than 2 seconds.   Neurological:      General: No focal deficit present.      Mental Status: She is alert and oriented to person, place, and time. Mental status is at baseline.   Psychiatric:         Mood and Affect: Mood normal.         Behavior: Behavior normal.         Thought Content: Thought content normal.         Judgment: Judgment normal.         Results for orders placed or  performed during the hospital encounter of 03/09/24   Comprehensive Metabolic Panel    Specimen: Blood   Result Value Ref Range    Glucose 89 65 - 99 mg/dL    BUN 7 6 - 20 mg/dL    Creatinine 0.72 0.57 - 1.00 mg/dL    Sodium 141 136 - 145 mmol/L    Potassium 4.1 3.5 - 5.2 mmol/L    Chloride 100 98 - 107 mmol/L    CO2 23.4 22.0 - 29.0 mmol/L    Calcium 8.7 8.6 - 10.5 mg/dL    Total Protein 7.7 6.0 - 8.5 g/dL    Albumin 4.7 3.5 - 5.2 g/dL    ALT (SGPT) 27 1 - 33 U/L    AST (SGOT) 31 1 - 32 U/L    Alkaline Phosphatase 91 39 - 117 U/L    Total Bilirubin 0.2 0.0 - 1.2 mg/dL    Globulin 3.0 gm/dL    A/G Ratio 1.6 g/dL    BUN/Creatinine Ratio 9.7 7.0 - 25.0    Anion Gap 17.6 (H) 5.0 - 15.0 mmol/L    eGFR 109.9 >60.0 mL/min/1.73   Urinalysis With Microscopic If Indicated (No Culture) - Urine, Clean Catch    Specimen: Urine, Clean Catch   Result Value Ref Range    Color, UA Yellow Yellow, Straw    Appearance, UA Cloudy (A) Clear    pH, UA 6.0 5.0 - 8.0    Specific Gravity, UA 1.011 1.005 - 1.030    Glucose, UA Negative Negative    Ketones, UA Negative Negative    Bilirubin, UA Negative Negative    Blood, UA Large (3+) (A) Negative    Protein, UA Trace (A) Negative    Leuk Esterase, UA Small (1+) (A) Negative    Nitrite, UA Negative Negative    Urobilinogen, UA 0.2 E.U./dL 0.2 - 1.0 E.U./dL   Lipase    Specimen: Blood   Result Value Ref Range    Lipase 45 13 - 60 U/L   Urine Drug Screen - Urine, Clean Catch    Specimen: Urine, Clean Catch   Result Value Ref Range    THC, Screen, Urine Negative Negative    Phencyclidine (PCP), Urine Negative Negative    Cocaine Screen, Urine Negative Negative    Methamphetamine, Ur Negative Negative    Opiate Screen Negative Negative    Amphetamine Screen, Urine Negative Negative    Benzodiazepine Screen, Urine Negative Negative    Tricyclic Antidepressants Screen Negative Negative    Methadone Screen, Urine Negative Negative    Barbiturates Screen, Urine Negative Negative    Oxycodone Screen,  Urine Negative Negative    Buprenorphine, Screen, Urine Negative Negative   Ethanol    Specimen: Blood   Result Value Ref Range    Ethanol 271 (H) 0 - 10 mg/dL    Ethanol % 0.271 %   Magnesium    Specimen: Blood   Result Value Ref Range    Magnesium 2.3 1.6 - 2.6 mg/dL   TSH    Specimen: Blood   Result Value Ref Range    TSH 0.609 0.270 - 4.200 uIU/mL   CBC Auto Differential    Specimen: Blood   Result Value Ref Range    WBC 8.87 3.40 - 10.80 10*3/mm3    RBC 5.51 (H) 3.77 - 5.28 10*6/mm3    Hemoglobin 11.7 (L) 12.0 - 15.9 g/dL    Hematocrit 38.9 34.0 - 46.6 %    MCV 70.6 (L) 79.0 - 97.0 fL    MCH 21.2 (L) 26.6 - 33.0 pg    MCHC 30.1 (L) 31.5 - 35.7 g/dL    RDW 17.8 (H) 12.3 - 15.4 %    RDW-SD 43.8 37.0 - 54.0 fl    MPV 8.6 6.0 - 12.0 fL    Platelets 352 140 - 450 10*3/mm3    Neutrophil % 56.1 42.7 - 76.0 %    Lymphocyte % 35.6 19.6 - 45.3 %    Monocyte % 5.3 5.0 - 12.0 %    Eosinophil % 0.6 0.3 - 6.2 %    Basophil % 1.2 0.0 - 1.5 %    Immature Grans % 1.2 (H) 0.0 - 0.5 %    Neutrophils, Absolute 4.97 1.70 - 7.00 10*3/mm3    Lymphocytes, Absolute 3.16 (H) 0.70 - 3.10 10*3/mm3    Monocytes, Absolute 0.47 0.10 - 0.90 10*3/mm3    Eosinophils, Absolute 0.05 0.00 - 0.40 10*3/mm3    Basophils, Absolute 0.11 0.00 - 0.20 10*3/mm3    Immature Grans, Absolute 0.11 (H) 0.00 - 0.05 10*3/mm3    nRBC 0.0 0.0 - 0.2 /100 WBC   Fentanyl, Urine - Urine, Clean Catch    Specimen: Urine, Clean Catch   Result Value Ref Range    Fentanyl, Urine Negative Negative   Urinalysis, Microscopic Only - Urine, Clean Catch    Specimen: Urine, Clean Catch   Result Value Ref Range    RBC, UA 0-2 None Seen, 0-2 /HPF    WBC, UA 11-20 (A) None Seen, 0-2 /HPF    Bacteria, UA 1+ (A) None Seen /HPF    Squamous Epithelial Cells, UA 13-20 (A) None Seen, 0-2 /HPF    Hyaline Casts, UA 3-6 None Seen /LPF    Methodology Automated Microscopy    [place] Urine Culture Tube - Urine, Clean Catch    Specimen: Urine, Clean Catch   Result Value Ref Range    Extra Tube  Hold for add-ons.    Pregnancy, Urine - Urine, Clean Catch    Specimen: Urine, Clean Catch   Result Value Ref Range    HCG, Urine QL Negative Negative   Ethanol    Specimen: Blood   Result Value Ref Range    Ethanol 115 (H) 0 - 10 mg/dL    Ethanol % 0.115 %   Ethanol    Specimen: Blood   Result Value Ref Range    Ethanol 77 (H) 0 - 10 mg/dL    Ethanol % 0.077 %   Green Top (Gel)   Result Value Ref Range    Extra Tube Hold for add-ons.    Lavender Top   Result Value Ref Range    Extra Tube hold for add-on    Gold Top - SST   Result Value Ref Range    Extra Tube Hold for add-ons.    Light Blue Top   Result Value Ref Range    Extra Tube Hold for add-ons.      CT Abdomen Pelvis With Contrast    Result Date: 3/9/2024   1. There is a 2 mm left UV junction calculus causing mild left ureterectasis.  2. Hepatic steatosis; consider correlation with LFT's.     To TALK to On Call [phone]  This report was finalized on 3/9/2024 8:21 PM by Rikki Oconnor MD.           Procedures           ED Course  ED Course as of 03/09/24 2354   Sat Mar 09, 2024   1826 Anion Gap(!): 17.6 [ML]   1826 WBC: 8.87 [ML]   1826 Hemoglobin(!): 11.7 [ML]   1826 Ethanol(!): 271 [ML]   1826 Magnesium: 2.3 [ML]   2226 CT Abdomen Pelvis With Contrast [ML]   2229 Endorsed pt to Dominga KUMARI at shift change.  [ML]   2353 Ethanol 77; pt does not wish to be admitted for alcohol detox; safe for discharge    [BD]      ED Course User Index  [BD] Lina Hernandez PA-C  [ML] Linda Anderson PA                                             Medical Decision Making  38-year-old female patient with a past medical history of alcoholism, anxiety, and depression presents to the emergency room today for alcohol detox.  Patient states her last drink was around noon today.  Patient states she been sober for a year and relapsed on Monday.  Patient states has been drinking every day since then.  She denies any suicidal or homicidal ideations.  Patient  denies any visual or auditory hallucinations.  She denies any withdrawal symptoms currently.  Patient states that she is going through a lot currently.  States that her mother is dying and she is going through a divorce with her .    Problems Addressed:  Alcoholic intoxication without complication: complicated acute illness or injury    Amount and/or Complexity of Data Reviewed  Labs: ordered. Decision-making details documented in ED Course.  Radiology: ordered. Decision-making details documented in ED Course.    Risk  Prescription drug management.        Final diagnoses:   Alcoholic intoxication without complication       ED Disposition  ED Disposition       ED Disposition   Discharge    Condition   Stable    Comment   --               Daniel Bauer MD  [address] [place] [id number]  [phone]    In 1 week           Medication List      No changes were made to your prescriptions during this visit.            Lina Hernandez PA-C  03/10/24 0021
